# Patient Record
Sex: MALE | Race: WHITE | NOT HISPANIC OR LATINO | Employment: FULL TIME | URBAN - METROPOLITAN AREA
[De-identification: names, ages, dates, MRNs, and addresses within clinical notes are randomized per-mention and may not be internally consistent; named-entity substitution may affect disease eponyms.]

---

## 2023-02-24 LAB
ALANINE AMINOTRANSFERASE (SGPT) (U/L) IN SER/PLAS: 12 U/L (ref 10–52)
ALBUMIN (G/DL) IN SER/PLAS: 4.7 G/DL (ref 3.4–5)
ALKALINE PHOSPHATASE (U/L) IN SER/PLAS: 93 U/L (ref 33–136)
ANION GAP IN SER/PLAS: 11 MMOL/L (ref 10–20)
ASPARTATE AMINOTRANSFERASE (SGOT) (U/L) IN SER/PLAS: 26 U/L (ref 9–39)
BASOPHILS (10*3/UL) IN BLOOD BY AUTOMATED COUNT: 0.08 X10E9/L (ref 0–0.1)
BASOPHILS/100 LEUKOCYTES IN BLOOD BY AUTOMATED COUNT: 1 % (ref 0–2)
BILIRUBIN TOTAL (MG/DL) IN SER/PLAS: 0.5 MG/DL (ref 0–1.2)
CALCIUM (MG/DL) IN SER/PLAS: 9.9 MG/DL (ref 8.6–10.6)
CARBON DIOXIDE, TOTAL (MMOL/L) IN SER/PLAS: 33 MMOL/L (ref 21–32)
CHLORIDE (MMOL/L) IN SER/PLAS: 101 MMOL/L (ref 98–107)
CREATININE (MG/DL) IN SER/PLAS: 0.79 MG/DL (ref 0.5–1.3)
EOSINOPHILS (10*3/UL) IN BLOOD BY AUTOMATED COUNT: 0.26 X10E9/L (ref 0–0.7)
EOSINOPHILS/100 LEUKOCYTES IN BLOOD BY AUTOMATED COUNT: 3.2 % (ref 0–6)
ERYTHROCYTE DISTRIBUTION WIDTH (RATIO) BY AUTOMATED COUNT: 12.3 % (ref 11.5–14.5)
ERYTHROCYTE MEAN CORPUSCULAR HEMOGLOBIN CONCENTRATION (G/DL) BY AUTOMATED: 33 G/DL (ref 32–36)
ERYTHROCYTE MEAN CORPUSCULAR VOLUME (FL) BY AUTOMATED COUNT: 95 FL (ref 80–100)
ERYTHROCYTES (10*6/UL) IN BLOOD BY AUTOMATED COUNT: 4.7 X10E12/L (ref 4.5–5.9)
GFR MALE: >90 ML/MIN/1.73M2
GLUCOSE (MG/DL) IN SER/PLAS: 65 MG/DL (ref 74–99)
HEMATOCRIT (%) IN BLOOD BY AUTOMATED COUNT: 44.6 % (ref 41–52)
HEMOGLOBIN (G/DL) IN BLOOD: 14.7 G/DL (ref 13.5–17.5)
IMMATURE GRANULOCYTES/100 LEUKOCYTES IN BLOOD BY AUTOMATED COUNT: 0.1 % (ref 0–0.9)
LEUKOCYTES (10*3/UL) IN BLOOD BY AUTOMATED COUNT: 8.3 X10E9/L (ref 4.4–11.3)
LYMPHOCYTES (10*3/UL) IN BLOOD BY AUTOMATED COUNT: 2.55 X10E9/L (ref 1.2–4.8)
LYMPHOCYTES/100 LEUKOCYTES IN BLOOD BY AUTOMATED COUNT: 30.9 % (ref 13–44)
MONOCYTES (10*3/UL) IN BLOOD BY AUTOMATED COUNT: 0.81 X10E9/L (ref 0.1–1)
MONOCYTES/100 LEUKOCYTES IN BLOOD BY AUTOMATED COUNT: 9.8 % (ref 2–10)
NEUTROPHILS (10*3/UL) IN BLOOD BY AUTOMATED COUNT: 4.54 X10E9/L (ref 1.2–7.7)
NEUTROPHILS/100 LEUKOCYTES IN BLOOD BY AUTOMATED COUNT: 55 % (ref 40–80)
NRBC (PER 100 WBCS) BY AUTOMATED COUNT: 0 /100 WBC (ref 0–0)
PLATELETS (10*3/UL) IN BLOOD AUTOMATED COUNT: 342 X10E9/L (ref 150–450)
POTASSIUM (MMOL/L) IN SER/PLAS: 4.8 MMOL/L (ref 3.5–5.3)
PROTEIN TOTAL: 6.8 G/DL (ref 6.4–8.2)
SODIUM (MMOL/L) IN SER/PLAS: 140 MMOL/L (ref 136–145)
UREA NITROGEN (MG/DL) IN SER/PLAS: 16 MG/DL (ref 6–23)

## 2023-03-14 DIAGNOSIS — M54.41 CHRONIC LOW BACK PAIN WITH BILATERAL SCIATICA, UNSPECIFIED BACK PAIN LATERALITY: Primary | ICD-10-CM

## 2023-03-14 DIAGNOSIS — G89.29 CHRONIC LOW BACK PAIN WITH BILATERAL SCIATICA, UNSPECIFIED BACK PAIN LATERALITY: Primary | ICD-10-CM

## 2023-03-14 DIAGNOSIS — J43.9 PULMONARY EMPHYSEMA, UNSPECIFIED EMPHYSEMA TYPE (MULTI): ICD-10-CM

## 2023-03-14 DIAGNOSIS — M54.42 CHRONIC LOW BACK PAIN WITH BILATERAL SCIATICA, UNSPECIFIED BACK PAIN LATERALITY: Primary | ICD-10-CM

## 2023-03-17 RX ORDER — GABAPENTIN 300 MG/1
CAPSULE ORAL
Qty: 180 CAPSULE | Refills: 1 | Status: SHIPPED | OUTPATIENT
Start: 2023-03-17 | End: 2023-04-07 | Stop reason: WASHOUT

## 2023-03-17 RX ORDER — IPRATROPIUM BROMIDE 21 UG/1
SPRAY, METERED NASAL
Qty: 30 ML | Refills: 1 | Status: SHIPPED | OUTPATIENT
Start: 2023-03-17 | End: 2023-05-01

## 2023-03-23 DIAGNOSIS — M19.90 ARTHRITIS: Primary | ICD-10-CM

## 2023-03-23 RX ORDER — CETIRIZINE HYDROCHLORIDE 10 MG/1
1 TABLET ORAL DAILY
COMMUNITY
Start: 2020-06-29

## 2023-03-23 RX ORDER — MELOXICAM 7.5 MG/1
TABLET ORAL 2 TIMES DAILY
COMMUNITY
Start: 2022-08-11 | End: 2023-03-23 | Stop reason: SDUPTHER

## 2023-03-23 RX ORDER — ATORVASTATIN CALCIUM 20 MG/1
0.5 TABLET, FILM COATED ORAL NIGHTLY
COMMUNITY
Start: 2018-12-14 | End: 2023-04-07 | Stop reason: SDUPTHER

## 2023-03-23 RX ORDER — PIRFENIDONE 267 MG/1
TABLET, FILM COATED ORAL
COMMUNITY
Start: 2023-02-21 | End: 2023-10-07 | Stop reason: ALTCHOICE

## 2023-03-23 RX ORDER — TIZANIDINE HYDROCHLORIDE 4 MG/1
CAPSULE, GELATIN COATED ORAL
COMMUNITY
Start: 2022-08-11 | End: 2023-10-07 | Stop reason: WASHOUT

## 2023-03-24 LAB
ALANINE AMINOTRANSFERASE (SGPT) (U/L) IN SER/PLAS: 12 U/L (ref 10–52)
ALBUMIN (G/DL) IN SER/PLAS: 4.3 G/DL (ref 3.4–5)
ALKALINE PHOSPHATASE (U/L) IN SER/PLAS: 77 U/L (ref 33–136)
ANION GAP IN SER/PLAS: 10 MMOL/L (ref 10–20)
APPEARANCE, URINE: CLEAR
ASPARTATE AMINOTRANSFERASE (SGOT) (U/L) IN SER/PLAS: 25 U/L (ref 9–39)
BILIRUBIN TOTAL (MG/DL) IN SER/PLAS: 0.6 MG/DL (ref 0–1.2)
BILIRUBIN, URINE: NEGATIVE
BLOOD, URINE: NEGATIVE
CALCIUM (MG/DL) IN SER/PLAS: 10 MG/DL (ref 8.6–10.6)
CARBON DIOXIDE, TOTAL (MMOL/L) IN SER/PLAS: 33 MMOL/L (ref 21–32)
CHLORIDE (MMOL/L) IN SER/PLAS: 100 MMOL/L (ref 98–107)
CHOLESTEROL (MG/DL) IN SER/PLAS: 236 MG/DL (ref 0–199)
CHOLESTEROL IN HDL (MG/DL) IN SER/PLAS: 62.7 MG/DL
CHOLESTEROL/HDL RATIO: 3.8
COLOR, URINE: YELLOW
CREATININE (MG/DL) IN SER/PLAS: 0.78 MG/DL (ref 0.5–1.3)
ERYTHROCYTE DISTRIBUTION WIDTH (RATIO) BY AUTOMATED COUNT: 12.8 % (ref 11.5–14.5)
ERYTHROCYTE MEAN CORPUSCULAR HEMOGLOBIN CONCENTRATION (G/DL) BY AUTOMATED: 32.5 G/DL (ref 32–36)
ERYTHROCYTE MEAN CORPUSCULAR VOLUME (FL) BY AUTOMATED COUNT: 94 FL (ref 80–100)
ERYTHROCYTES (10*6/UL) IN BLOOD BY AUTOMATED COUNT: 4.77 X10E12/L (ref 4.5–5.9)
ESTIMATED AVERAGE GLUCOSE FOR HBA1C: 108 MG/DL
GFR MALE: >90 ML/MIN/1.73M2
GLUCOSE (MG/DL) IN SER/PLAS: 91 MG/DL (ref 74–99)
GLUCOSE, URINE: NEGATIVE MG/DL
HEMATOCRIT (%) IN BLOOD BY AUTOMATED COUNT: 44.6 % (ref 41–52)
HEMOGLOBIN (G/DL) IN BLOOD: 14.5 G/DL (ref 13.5–17.5)
HEMOGLOBIN A1C/HEMOGLOBIN TOTAL IN BLOOD: 5.4 %
KETONES, URINE: NEGATIVE MG/DL
LDL: 144 MG/DL (ref 0–99)
LEUKOCYTE ESTERASE, URINE: NEGATIVE
LEUKOCYTES (10*3/UL) IN BLOOD BY AUTOMATED COUNT: 7.9 X10E9/L (ref 4.4–11.3)
NITRITE, URINE: NEGATIVE
NRBC (PER 100 WBCS) BY AUTOMATED COUNT: 0 /100 WBC (ref 0–0)
PH, URINE: 5 (ref 5–8)
PLATELETS (10*3/UL) IN BLOOD AUTOMATED COUNT: 300 X10E9/L (ref 150–450)
POTASSIUM (MMOL/L) IN SER/PLAS: 4.8 MMOL/L (ref 3.5–5.3)
PROSTATE SPECIFIC ANTIGEN,SCREEN: 1.12 NG/ML (ref 0–4)
PROTEIN TOTAL: 6.7 G/DL (ref 6.4–8.2)
PROTEIN, URINE: NEGATIVE MG/DL
SODIUM (MMOL/L) IN SER/PLAS: 138 MMOL/L (ref 136–145)
SPECIFIC GRAVITY, URINE: 1.01 (ref 1–1.03)
TRIGLYCERIDE (MG/DL) IN SER/PLAS: 146 MG/DL (ref 0–149)
UREA NITROGEN (MG/DL) IN SER/PLAS: 16 MG/DL (ref 6–23)
UROBILINOGEN, URINE: <2 MG/DL (ref 0–1.9)
VLDL: 29 MG/DL (ref 0–40)

## 2023-03-25 RX ORDER — MELOXICAM 7.5 MG/1
7.5 TABLET ORAL 2 TIMES DAILY
Qty: 30 TABLET | Refills: 1 | Status: SHIPPED | OUTPATIENT
Start: 2023-03-25 | End: 2023-04-07 | Stop reason: WASHOUT

## 2023-04-07 ENCOUNTER — OFFICE VISIT (OUTPATIENT)
Dept: PRIMARY CARE | Facility: CLINIC | Age: 63
End: 2023-04-07
Payer: COMMERCIAL

## 2023-04-07 VITALS
HEIGHT: 74 IN | HEART RATE: 59 BPM | WEIGHT: 244 LBS | TEMPERATURE: 98.6 F | SYSTOLIC BLOOD PRESSURE: 130 MMHG | DIASTOLIC BLOOD PRESSURE: 80 MMHG | OXYGEN SATURATION: 96 % | BODY MASS INDEX: 31.32 KG/M2

## 2023-04-07 DIAGNOSIS — Z00.00 ROUTINE GENERAL MEDICAL EXAMINATION AT HEALTH CARE FACILITY: Primary | ICD-10-CM

## 2023-04-07 DIAGNOSIS — M16.10 HIP ARTHRITIS: ICD-10-CM

## 2023-04-07 DIAGNOSIS — Z12.11 SCREENING FOR COLORECTAL CANCER: ICD-10-CM

## 2023-04-07 DIAGNOSIS — Z23 NEED FOR VACCINATION: ICD-10-CM

## 2023-04-07 DIAGNOSIS — Z13.6 SCREENING FOR CARDIOVASCULAR CONDITION: ICD-10-CM

## 2023-04-07 DIAGNOSIS — Z12.12 SCREENING FOR COLORECTAL CANCER: ICD-10-CM

## 2023-04-07 DIAGNOSIS — E78.5 DYSLIPIDEMIA: ICD-10-CM

## 2023-04-07 PROCEDURE — 1036F TOBACCO NON-USER: CPT | Performed by: INTERNAL MEDICINE

## 2023-04-07 PROCEDURE — 93000 ELECTROCARDIOGRAM COMPLETE: CPT | Performed by: INTERNAL MEDICINE

## 2023-04-07 PROCEDURE — 99214 OFFICE O/P EST MOD 30 MIN: CPT | Performed by: INTERNAL MEDICINE

## 2023-04-07 RX ORDER — MELOXICAM 7.5 MG/1
TABLET ORAL 2 TIMES DAILY
COMMUNITY
Start: 2022-08-11 | End: 2023-04-11 | Stop reason: SDUPTHER

## 2023-04-07 RX ORDER — GABAPENTIN 300 MG/1
1 CAPSULE ORAL 2 TIMES DAILY
COMMUNITY
Start: 2023-02-02 | End: 2023-08-23

## 2023-04-07 RX ORDER — ATORVASTATIN CALCIUM 20 MG/1
10 TABLET, FILM COATED ORAL NIGHTLY
Qty: 45 TABLET | Refills: 2 | Status: SHIPPED | OUTPATIENT
Start: 2023-04-07 | End: 2023-04-07

## 2023-04-07 RX ORDER — PNEUMOCOCCAL 20-VALENT CONJUGATE VACCINE 2.2; 2.2; 2.2; 2.2; 2.2; 2.2; 2.2; 2.2; 2.2; 2.2; 2.2; 2.2; 2.2; 2.2; 2.2; 2.2; 4.4; 2.2; 2.2; 2.2 UG/.5ML; UG/.5ML; UG/.5ML; UG/.5ML; UG/.5ML; UG/.5ML; UG/.5ML; UG/.5ML; UG/.5ML; UG/.5ML; UG/.5ML; UG/.5ML; UG/.5ML; UG/.5ML; UG/.5ML; UG/.5ML; UG/.5ML; UG/.5ML; UG/.5ML; UG/.5ML
0.5 INJECTION, SUSPENSION INTRAMUSCULAR ONCE
Qty: 0.5 ML | Refills: 0 | Status: SHIPPED | OUTPATIENT
Start: 2023-04-07 | End: 2023-04-07

## 2023-04-07 ASSESSMENT — ENCOUNTER SYMPTOMS
HEMATOLOGIC/LYMPHATIC NEGATIVE: 1
ACTIVITY CHANGE: 0
CHEST TIGHTNESS: 0
COUGH: 0
APPETITE CHANGE: 0
JOINT SWELLING: 0
FLANK PAIN: 0
APNEA: 0
DIFFICULTY URINATING: 0
HEMATURIA: 0
MYALGIAS: 0
CHILLS: 0
CONSTITUTIONAL NEGATIVE: 1
EYE PAIN: 1
FEVER: 0
FATIGUE: 0

## 2023-04-07 ASSESSMENT — PAIN SCALES - GENERAL: PAINLEVEL: 4

## 2023-04-07 ASSESSMENT — PATIENT HEALTH QUESTIONNAIRE - PHQ9
1. LITTLE INTEREST OR PLEASURE IN DOING THINGS: NOT AT ALL
2. FEELING DOWN, DEPRESSED OR HOPELESS: NOT AT ALL
SUM OF ALL RESPONSES TO PHQ9 QUESTIONS 1 AND 2: 0
SUM OF ALL RESPONSES TO PHQ9 QUESTIONS 1 AND 2: 0
2. FEELING DOWN, DEPRESSED OR HOPELESS: NOT AT ALL
1. LITTLE INTEREST OR PLEASURE IN DOING THINGS: NOT AT ALL

## 2023-04-07 ASSESSMENT — LIFESTYLE VARIABLES
HOW MANY STANDARD DRINKS CONTAINING ALCOHOL DO YOU HAVE ON A TYPICAL DAY: 1 OR 2
AUDIT-C TOTAL SCORE: 3
HOW OFTEN DO YOU HAVE A DRINK CONTAINING ALCOHOL: 2-4 TIMES A MONTH
HOW OFTEN DO YOU HAVE SIX OR MORE DRINKS ON ONE OCCASION: LESS THAN MONTHLY
SKIP TO QUESTIONS 9-10: 0

## 2023-04-07 ASSESSMENT — ACTIVITIES OF DAILY LIVING (ADL)
GROCERY_SHOPPING: INDEPENDENT
DOING_HOUSEWORK: INDEPENDENT
BATHING: INDEPENDENT
TAKING_MEDICATION: INDEPENDENT
MANAGING_FINANCES: INDEPENDENT
DRESSING: INDEPENDENT

## 2023-04-07 ASSESSMENT — COLUMBIA-SUICIDE SEVERITY RATING SCALE - C-SSRS: 1. IN THE PAST MONTH, HAVE YOU WISHED YOU WERE DEAD OR WISHED YOU COULD GO TO SLEEP AND NOT WAKE UP?: NO

## 2023-04-07 ASSESSMENT — VISUAL ACUITY
OS_CC: 20/25
OD_CC: 20/25

## 2023-04-07 NOTE — PROGRESS NOTES
"Subjective   Reason for Visit: Micky Koenig is an 62 y.o. male here for a Medicare Wellness visit.               HPI    Patient Care Team:  Viktor Muñiz MD as PCP - General  Hugo Ayon MD as PCP - Employee ACO PCP     Review of Systems    Objective   Vitals:  /80   Pulse 59   Temp 37 °C (98.6 °F)   Ht 1.88 m (6' 2\")   Wt 111 kg (244 lb)   SpO2 96%   BMI 31.33 kg/m²       Physical Exam    Assessment/Plan   Problem List Items Addressed This Visit    None         "

## 2023-04-07 NOTE — PROGRESS NOTES
"Subjective   Reason for Visit: Micky Koenig is an 62 y.o. male here for a Medicare Wellness visit.          Reviewed all medications by prescribing practitioner or clinical pharmacist (such as prescriptions, OTCs, herbal therapies and supplements) and documented in the medical record.    HPI Patient present to clinic for follow-up visit on history of allergic rhinitis, dyslipidemia, ,IPF,mild obesity and benign colon polyp.  He is doing fairly well and denies any fever, cough, congestion, palpitation and headache.  Laboratory studies done showed hemoglobin A1c of 5.4% serum cholesterol of 236 LDL of 144, hemoglobin of 14.5, bicarb of 33 and other studies are unremarkable.  EKG done shows sinus bradycardia 49 bpm with RSR prime in lead V1, nonspecific T wave inversion in lead III without any definite ischemia.     Patient Care Team:  Viktor Muñiz MD as PCP - General  Hugo Ayon MD as PCP - Employee ACO PCP     Review of Systems   Constitutional: Negative.  Negative for activity change, appetite change, chills, fatigue and fever.   HENT:  Negative for congestion, drooling, ear discharge and ear pain.    Eyes:  Positive for pain (cmment).   Respiratory:  Negative for apnea, cough and chest tightness.    Genitourinary:  Negative for difficulty urinating, enuresis, flank pain and hematuria.   Musculoskeletal:  Negative for joint swelling and myalgias.   Hematological: Negative.        Objective   Vitals:  /80   Pulse 59   Temp 37 °C (98.6 °F)   Ht 1.88 m (6' 2\")   Wt 111 kg (244 lb)   SpO2 96%   BMI 31.33 kg/m²       Physical Exam  Constitutional:       Appearance: Normal appearance. He is obese.   HENT:      Right Ear: Tympanic membrane normal.      Left Ear: Tympanic membrane and ear canal normal.      Nose: Nose normal.   Neck:      Vascular: No carotid bruit.   Cardiovascular:      Rate and Rhythm: Normal rate.   Pulmonary:      Effort: No respiratory distress.      Breath sounds: No stridor. No " wheezing.   Abdominal:      Palpations: Abdomen is soft.      Tenderness: There is no abdominal tenderness. There is no guarding or rebound.   Skin:     Coloration: Skin is not jaundiced.   Neurological:      General: No focal deficit present.      Mental Status: He is alert and oriented to person, place, and time.   Psychiatric:         Mood and Affect: Mood normal.         Assessment/Plan   Problem List Items Addressed This Visit    None  Patient will be scheduled for CT cardiac scoring regarding screening for cardiovascular disease.  He will continue to follow-up with pulmonary clinic regarding history of idiopathic pulmonary fibrosis.  He will also follow-up with orthopedic clinic regarding arthritis of his hips.  He will continue current medications and will return to clinic in 6 months for follow-up visit.

## 2023-04-11 ENCOUNTER — TELEPHONE (OUTPATIENT)
Dept: PRIMARY CARE | Facility: CLINIC | Age: 63
End: 2023-04-11
Payer: COMMERCIAL

## 2023-04-11 DIAGNOSIS — M16.10 HIP ARTHRITIS: Primary | ICD-10-CM

## 2023-04-11 RX ORDER — MELOXICAM 7.5 MG/1
7.5 TABLET ORAL DAILY
Qty: 90 TABLET | Refills: 1 | Status: SHIPPED | OUTPATIENT
Start: 2023-04-11 | End: 2023-04-11

## 2023-04-28 ENCOUNTER — APPOINTMENT (OUTPATIENT)
Dept: LAB | Facility: LAB | Age: 63
End: 2023-04-28
Payer: COMMERCIAL

## 2023-04-28 LAB
ALANINE AMINOTRANSFERASE (SGPT) (U/L) IN SER/PLAS: 10 U/L (ref 10–52)
ALBUMIN (G/DL) IN SER/PLAS: 4.7 G/DL (ref 3.4–5)
ALKALINE PHOSPHATASE (U/L) IN SER/PLAS: 76 U/L (ref 33–136)
ANION GAP IN SER/PLAS: 10 MMOL/L (ref 10–20)
ASPARTATE AMINOTRANSFERASE (SGOT) (U/L) IN SER/PLAS: 21 U/L (ref 9–39)
BASOPHILS (10*3/UL) IN BLOOD BY AUTOMATED COUNT: 0.07 X10E9/L (ref 0–0.1)
BASOPHILS/100 LEUKOCYTES IN BLOOD BY AUTOMATED COUNT: 0.7 % (ref 0–2)
BILIRUBIN DIRECT (MG/DL) IN SER/PLAS: 0.1 MG/DL (ref 0–0.3)
BILIRUBIN TOTAL (MG/DL) IN SER/PLAS: 0.6 MG/DL (ref 0–1.2)
CALCIUM (MG/DL) IN SER/PLAS: 10 MG/DL (ref 8.6–10.3)
CARBON DIOXIDE, TOTAL (MMOL/L) IN SER/PLAS: 33 MMOL/L (ref 21–32)
CHLORIDE (MMOL/L) IN SER/PLAS: 99 MMOL/L (ref 98–107)
CREATININE (MG/DL) IN SER/PLAS: 0.68 MG/DL (ref 0.5–1.3)
EOSINOPHILS (10*3/UL) IN BLOOD BY AUTOMATED COUNT: 0.25 X10E9/L (ref 0–0.7)
EOSINOPHILS/100 LEUKOCYTES IN BLOOD BY AUTOMATED COUNT: 2.7 % (ref 0–6)
ERYTHROCYTE DISTRIBUTION WIDTH (RATIO) BY AUTOMATED COUNT: 13 % (ref 11.5–14.5)
ERYTHROCYTE MEAN CORPUSCULAR HEMOGLOBIN CONCENTRATION (G/DL) BY AUTOMATED: 32.3 G/DL (ref 32–36)
ERYTHROCYTE MEAN CORPUSCULAR VOLUME (FL) BY AUTOMATED COUNT: 96 FL (ref 80–100)
ERYTHROCYTES (10*6/UL) IN BLOOD BY AUTOMATED COUNT: 4.83 X10E12/L (ref 4.5–5.9)
GFR MALE: >90 ML/MIN/1.73M2
GLUCOSE (MG/DL) IN SER/PLAS: 66 MG/DL (ref 74–99)
HEMATOCRIT (%) IN BLOOD BY AUTOMATED COUNT: 46.2 % (ref 41–52)
HEMOGLOBIN (G/DL) IN BLOOD: 14.9 G/DL (ref 13.5–17.5)
IMMATURE GRANULOCYTES/100 LEUKOCYTES IN BLOOD BY AUTOMATED COUNT: 0.2 % (ref 0–0.9)
LEUKOCYTES (10*3/UL) IN BLOOD BY AUTOMATED COUNT: 9.4 X10E9/L (ref 4.4–11.3)
LYMPHOCYTES (10*3/UL) IN BLOOD BY AUTOMATED COUNT: 3.34 X10E9/L (ref 1.2–4.8)
LYMPHOCYTES/100 LEUKOCYTES IN BLOOD BY AUTOMATED COUNT: 35.5 % (ref 13–44)
MONOCYTES (10*3/UL) IN BLOOD BY AUTOMATED COUNT: 1 X10E9/L (ref 0.1–1)
MONOCYTES/100 LEUKOCYTES IN BLOOD BY AUTOMATED COUNT: 10.6 % (ref 2–10)
NEUTROPHILS (10*3/UL) IN BLOOD BY AUTOMATED COUNT: 4.73 X10E9/L (ref 1.2–7.7)
NEUTROPHILS/100 LEUKOCYTES IN BLOOD BY AUTOMATED COUNT: 50.3 % (ref 40–80)
PLATELETS (10*3/UL) IN BLOOD AUTOMATED COUNT: 339 X10E9/L (ref 150–450)
POTASSIUM (MMOL/L) IN SER/PLAS: 4.6 MMOL/L (ref 3.5–5.3)
PROTEIN TOTAL: 7 G/DL (ref 6.4–8.2)
SODIUM (MMOL/L) IN SER/PLAS: 137 MMOL/L (ref 136–145)
UREA NITROGEN (MG/DL) IN SER/PLAS: 19 MG/DL (ref 6–23)

## 2023-04-29 LAB — SARS-COV-2 RESULT: NOT DETECTED

## 2023-05-01 ENCOUNTER — HOSPITAL ENCOUNTER (OUTPATIENT)
Dept: DATA CONVERSION | Facility: HOSPITAL | Age: 63
End: 2023-05-01
Attending: ORTHOPAEDIC SURGERY | Admitting: ORTHOPAEDIC SURGERY
Payer: COMMERCIAL

## 2023-05-01 DIAGNOSIS — E78.5 HYPERLIPIDEMIA, UNSPECIFIED: ICD-10-CM

## 2023-05-01 DIAGNOSIS — M16.12 UNILATERAL PRIMARY OSTEOARTHRITIS, LEFT HIP: ICD-10-CM

## 2023-05-01 DIAGNOSIS — J43.9 PULMONARY EMPHYSEMA, UNSPECIFIED EMPHYSEMA TYPE (MULTI): ICD-10-CM

## 2023-05-01 RX ORDER — IPRATROPIUM BROMIDE 21 UG/1
SPRAY, METERED NASAL
Qty: 30 ML | Refills: 1 | Status: SHIPPED | OUTPATIENT
Start: 2023-05-01 | End: 2023-06-19 | Stop reason: SDUPTHER

## 2023-05-01 RX ORDER — IPRATROPIUM BROMIDE 21 UG/1
SPRAY, METERED NASAL
COMMUNITY
Start: 2021-08-26 | End: 2023-11-27 | Stop reason: SDUPTHER

## 2023-05-24 LAB
COMPLETE PATHOLOGY REPORT: NORMAL
CONVERTED CLINICAL DIAGNOSIS-HISTORY: NORMAL
CONVERTED FINAL DIAGNOSIS: NORMAL
CONVERTED FINAL REPORT PDF LINK TO COPY AND PASTE: NORMAL
CONVERTED GROSS DESCRIPTION: NORMAL

## 2023-05-25 ENCOUNTER — TELEPHONE (OUTPATIENT)
Dept: PRIMARY CARE | Facility: CLINIC | Age: 63
End: 2023-05-25
Payer: COMMERCIAL

## 2023-05-25 DIAGNOSIS — M16.10 HIP ARTHRITIS: Primary | ICD-10-CM

## 2023-05-25 RX ORDER — MELOXICAM 7.5 MG/1
7.5 TABLET ORAL 2 TIMES DAILY
Qty: 60 TABLET | Refills: 2 | Status: SHIPPED | OUTPATIENT
Start: 2023-05-25 | End: 2023-08-15

## 2023-05-25 RX ORDER — OMEPRAZOLE 20 MG/1
20 TABLET, DELAYED RELEASE ORAL DAILY
Qty: 30 TABLET | Refills: 2 | COMMUNITY
Start: 2023-05-25 | End: 2023-10-07 | Stop reason: ALTCHOICE

## 2023-05-25 NOTE — TELEPHONE ENCOUNTER
He is taking meloxicam 7.5 mg and he did not realize that it was switched to 1x a day instead of 2x a day and he is now out of it and the pharmacy said it is too soon to refill and they recommended calling in a new rx for the meloxicam with the directions stating 2x a day instead of 1 so he can get it refilled.

## 2023-06-19 DIAGNOSIS — J43.9 PULMONARY EMPHYSEMA, UNSPECIFIED EMPHYSEMA TYPE (MULTI): ICD-10-CM

## 2023-06-19 RX ORDER — IPRATROPIUM BROMIDE 21 UG/1
2 SPRAY, METERED NASAL 2 TIMES DAILY
Qty: 24 ML | Refills: 0 | Status: SHIPPED | OUTPATIENT
Start: 2023-06-19 | End: 2023-10-07 | Stop reason: ALTCHOICE

## 2023-08-03 LAB
ALANINE AMINOTRANSFERASE (SGPT) (U/L) IN SER/PLAS: 10 U/L (ref 10–52)
ALBUMIN (G/DL) IN SER/PLAS: 4.3 G/DL (ref 3.4–5)
ALKALINE PHOSPHATASE (U/L) IN SER/PLAS: 59 U/L (ref 33–136)
ANION GAP IN SER/PLAS: 11 MMOL/L (ref 10–20)
ASPARTATE AMINOTRANSFERASE (SGOT) (U/L) IN SER/PLAS: 24 U/L (ref 9–39)
BILIRUBIN DIRECT (MG/DL) IN SER/PLAS: 0.1 MG/DL (ref 0–0.3)
BILIRUBIN TOTAL (MG/DL) IN SER/PLAS: 0.4 MG/DL (ref 0–1.2)
CALCIUM (MG/DL) IN SER/PLAS: 9.5 MG/DL (ref 8.6–10.3)
CARBON DIOXIDE, TOTAL (MMOL/L) IN SER/PLAS: 30 MMOL/L (ref 21–32)
CHLORIDE (MMOL/L) IN SER/PLAS: 101 MMOL/L (ref 98–107)
CREATININE (MG/DL) IN SER/PLAS: 0.7 MG/DL (ref 0.5–1.3)
GFR MALE: >90 ML/MIN/1.73M2
GLUCOSE (MG/DL) IN SER/PLAS: 80 MG/DL (ref 74–99)
POTASSIUM (MMOL/L) IN SER/PLAS: 4.2 MMOL/L (ref 3.5–5.3)
PROTEIN TOTAL: 6.6 G/DL (ref 6.4–8.2)
SODIUM (MMOL/L) IN SER/PLAS: 138 MMOL/L (ref 136–145)
UREA NITROGEN (MG/DL) IN SER/PLAS: 21 MG/DL (ref 6–23)

## 2023-08-07 ENCOUNTER — HOSPITAL ENCOUNTER (OUTPATIENT)
Dept: DATA CONVERSION | Facility: HOSPITAL | Age: 63
End: 2023-08-07
Attending: ORTHOPAEDIC SURGERY | Admitting: ORTHOPAEDIC SURGERY
Payer: COMMERCIAL

## 2023-08-07 DIAGNOSIS — M16.11 UNILATERAL PRIMARY OSTEOARTHRITIS, RIGHT HIP: ICD-10-CM

## 2023-08-15 DIAGNOSIS — M16.10 HIP ARTHRITIS: ICD-10-CM

## 2023-08-15 RX ORDER — MELOXICAM 7.5 MG/1
7.5 TABLET ORAL 2 TIMES DAILY
Qty: 60 TABLET | Refills: 1 | Status: SHIPPED | OUTPATIENT
Start: 2023-08-15 | End: 2023-08-15

## 2023-08-23 DIAGNOSIS — G89.29 CHRONIC BILATERAL LOW BACK PAIN WITHOUT SCIATICA: Primary | ICD-10-CM

## 2023-08-23 DIAGNOSIS — M54.50 CHRONIC BILATERAL LOW BACK PAIN WITHOUT SCIATICA: Primary | ICD-10-CM

## 2023-08-23 RX ORDER — GABAPENTIN 300 MG/1
300 CAPSULE ORAL 2 TIMES DAILY
Qty: 180 CAPSULE | Refills: 0 | Status: SHIPPED | OUTPATIENT
Start: 2023-08-23 | End: 2023-08-23

## 2023-09-07 VITALS — BODY MASS INDEX: 31.69 KG/M2 | WEIGHT: 246.91 LBS | HEIGHT: 74 IN

## 2023-09-14 NOTE — H&P
History & Physical Reviewed:   I have reviewed the History and Physical dated:  07-Apr-2023   History and Physical reviewed and relevant findings noted. Patient examined to review pertinent physical  findings.: No significant changes   Home Medications Reviewed: no changes noted   Allergies Reviewed: no changes noted       ERAS (Enhanced Recovery After Surgery):  ·  ERAS Patient: no     Consent:   COVID-19 Consent:  ·  COVID-19 Risk Consent Surgeon has reviewed key risks related to the risk of beck COVID-19 and if they contract COVID-19 what the risks are.     Attestation:   Note Completion:  I am a:  Resident/Fellow   Attending Attestation I saw and evaluated the patient.  I personally obtained the key and critical portions of the history and physical exam or was physically present for key and  critical portions performed by the resident/fellow. I reviewed the resident/fellow?s documentation and discussed the patient with the resident/fellow.  I agree with the resident/fellow?s medical decision making as documented in the note.     I personally evaluated the patient on 01-May-2023         Electronic Signatures:  Shubham Daniel (DO (Resident))  (Signed 01-May-2023 07:09)   Authored: History & Physical Reviewed, ERAS, Consent,  Note Completion  Robert Murdock)  (Signed 01-May-2023 16:26)   Authored: Note Completion   Co-Signer: History & Physical Reviewed, ERAS, Consent, Note Completion      Last Updated: 01-May-2023 16:26 by Robert Murdock)

## 2023-09-29 VITALS — WEIGHT: 238.54 LBS | HEIGHT: 74 IN | BODY MASS INDEX: 30.61 KG/M2

## 2023-09-30 ENCOUNTER — LAB (OUTPATIENT)
Dept: LAB | Facility: LAB | Age: 63
End: 2023-09-30
Payer: COMMERCIAL

## 2023-09-30 DIAGNOSIS — Z00.00 ROUTINE GENERAL MEDICAL EXAMINATION AT HEALTH CARE FACILITY: ICD-10-CM

## 2023-09-30 DIAGNOSIS — M16.10 HIP ARTHRITIS: ICD-10-CM

## 2023-09-30 DIAGNOSIS — E78.5 DYSLIPIDEMIA: ICD-10-CM

## 2023-09-30 LAB
ANION GAP SERPL CALC-SCNC: 13 MMOL/L (ref 10–20)
BASOPHILS # BLD AUTO: 0.06 X10*3/UL (ref 0–0.1)
BASOPHILS NFR BLD AUTO: 0.7 %
BUN SERPL-MCNC: 15 MG/DL (ref 6–23)
CALCIUM SERPL-MCNC: 9.9 MG/DL (ref 8.6–10.6)
CHLORIDE SERPL-SCNC: 103 MMOL/L (ref 98–107)
CHOLEST SERPL-MCNC: 231 MG/DL (ref 0–199)
CHOLESTEROL/HDL RATIO: 3.4
CO2 SERPL-SCNC: 29 MMOL/L (ref 21–32)
CREAT SERPL-MCNC: 0.64 MG/DL (ref 0.5–1.3)
EOSINOPHIL # BLD AUTO: 0.21 X10*3/UL (ref 0–0.7)
EOSINOPHIL NFR BLD AUTO: 2.3 %
ERYTHROCYTE [DISTWIDTH] IN BLOOD BY AUTOMATED COUNT: 13.3 % (ref 11.5–14.5)
GFR SERPL CREATININE-BSD FRML MDRD: >90 ML/MIN/1.73M*2
GLUCOSE SERPL-MCNC: 110 MG/DL (ref 74–99)
HCT VFR BLD AUTO: 44.7 % (ref 41–52)
HDLC SERPL-MCNC: 68.4 MG/DL
HGB BLD-MCNC: 14.9 G/DL (ref 13.5–17.5)
IMM GRANULOCYTES # BLD AUTO: 0.02 X10*3/UL (ref 0–0.7)
IMM GRANULOCYTES NFR BLD AUTO: 0.2 % (ref 0–0.9)
LDLC SERPL CALC-MCNC: 145 MG/DL (ref 140–190)
LYMPHOCYTES # BLD AUTO: 3.3 X10*3/UL (ref 1.2–4.8)
LYMPHOCYTES NFR BLD AUTO: 35.9 %
MCH RBC QN AUTO: 31.6 PG (ref 26–34)
MCHC RBC AUTO-ENTMCNC: 33.3 G/DL (ref 32–36)
MCV RBC AUTO: 95 FL (ref 80–100)
MONOCYTES # BLD AUTO: 0.8 X10*3/UL (ref 0.1–1)
MONOCYTES NFR BLD AUTO: 8.7 %
NEUTROPHILS # BLD AUTO: 4.81 X10*3/UL (ref 1.2–7.7)
NEUTROPHILS NFR BLD AUTO: 52.2 %
NON HDL CHOLESTEROL: 163 MG/DL (ref 0–149)
NRBC BLD-RTO: 0 /100 WBCS (ref 0–0)
PLATELET # BLD AUTO: 332 X10*3/UL (ref 150–450)
PMV BLD AUTO: 9.6 FL (ref 7.5–11.5)
POTASSIUM SERPL-SCNC: 4.5 MMOL/L (ref 3.5–5.3)
RBC # BLD AUTO: 4.72 X10*6/UL (ref 4.5–5.9)
SODIUM SERPL-SCNC: 140 MMOL/L (ref 136–145)
TRIGL SERPL-MCNC: 89 MG/DL (ref 0–149)
VLDL: 18 MG/DL (ref 0–40)
WBC # BLD AUTO: 9.2 X10*3/UL (ref 4.4–11.3)

## 2023-09-30 PROCEDURE — 36415 COLL VENOUS BLD VENIPUNCTURE: CPT

## 2023-09-30 NOTE — H&P
History & Physical Reviewed:   I have reviewed the History and Physical dated:  24-Jul-2023   History and Physical reviewed and relevant findings noted. Patient examined to review pertinent physical  findings.: No significant changes   Home Medications Reviewed: no changes noted   Allergies Reviewed: no changes noted       ERAS (Enhanced Recovery After Surgery):  ·  ERAS Patient: no     Consent:   COVID-19 Consent:  ·  COVID-19 Risk Consent Surgeon has reviewed key risks related to the risk of beck COVID-19 and if they contract COVID-19 what the risks are.       Electronic Signatures:  Robert Murdock)  (Signed 07-Aug-2023 06:59)   Authored: History & Physical Reviewed, ERAS, Consent,  Note Completion      Last Updated: 07-Aug-2023 06:59 by Robert Murdock)

## 2023-10-01 NOTE — OP NOTE
Post Operative Note:     PreOp Diagnosis: Right hip osteoarthritis   Post-Procedure Diagnosis: Same   Procedure: 1. Right direct anterior total hip arthroplasty   Surgeon: Mathieu   Resident/Fellow/Other Assistant: Haase   Anesthesia: Spinal   Estimated Blood Loss (mL): 250cc   Specimen: yes. bone   Findings: severe djd   Implants: elver trident II tritanium 54mm acetabular  shell, 36mm X3 poly liner, accolade II size 9 x 127 femoral stem, biolox delta ceramic 36mm +0mm femoral head   Additional Details: WBAT RLE  ASA 81     Operative Report Dictated:  Dictation: not applicable - note contains Operative  Report    Operative Report:    Statement of medical necessity:    This is a 63-year-old male with severe degenerative disease of the right hip that has failed non operative management. the risks, benefits and alternatives of total hip arthroplasty were discussed with the patient and they signed informed consent.      DESCRIPTION OF PROCEDURE:  The patient was brought to the operative room, and anesthesia was initiated by the anesthesia team. Appropriate preoperative antibiotics were given. The patient was then secured to the [place] table in the standard fashion. The patient was prepped and draped  in the usual sterile fashion, a time out was performed, the patient was identified by name and medical record number and the laterality and site of surgery were confirmed by all present.  Standard direct anterior approach to the hip was made. Hemostasis  was obtained with Bovie electrocautery. Anterior and superior capsulectomy was performed in the usual fashion. Femoral neck osteotomy was performed in accordance with the preoperative plan, and femoral head extracted from the acetabulum without difficulty.  There was noted to be loss of articular cartilage from the femoral head and acetabulum as well as osteophyte formation on both the femoral and acetabular sides.     Periacetabular retractors were placed, with  excellent exposure of the acetabulum being obtained. Remnants of the acetabular labrum were excised. The medial wall of the acetabulum was developed with a reamer 5 mm smaller than the preoperative plan. Reaming  then continued circumferentially. The final reamer was line to line with the the definitive implant size. Good quality bone was noted. After irrigation, the acetabular component was inserted with the appropriate degree of abduction and anteversion based  upon anatomic landmarks. Appropriate positioning was confirmed with an AP image of the pelvis and AP of the operative hip using C-arm. After terminal impaction, the highly cross-linked polyethylene liner was inserted into the acetabular component and  locking mechanism engaged in the usual fashion.    Attention was then directed to the femoral side. The femoral elevating hook was inserted. Further soft tissue release was performed to allow anterior translation of the proximal femur. This included release of the obturator internus tendon. The obturator  externus was preserved. After adequate mobilization of the femur, the medullary canal was developed with a curved curette followed by a reverse cutting rasp. The proximal femur is then prepared using sequentially larger broaches up to the definitive implant  size. The final broach was both rotationally and longitudinally stable. The hip was reduced and flouro was used to assess the implant. There was excellent fit and fill of the implant and the leg lengths looked appropriate comparing the lesser tuberosities  to the ischium on both sides. Stability was then tested; with 60 degrees of external rotation of the hip and full extension the implant was stable.     Trial implant was removed from the femur. The definitive implant was inserted. The trunnion of the femoral component was then cleaned and dried, followed by impaction of the definitive prosthetic femoral head. The hip was reduced with normal findings   again noted.    The wound was irrigated with irrisept solution followed by normal saline. The pericapsular soft tissues were injected with ropivicaine, epinephrine, toradol,  and duramorph. The myofascia was closed using interrupted #1 polysorb, then #2 quill, followed  by skin closure using inverted 2-0 poly, 3-0 v-lock in the subcuticular layer, and perineo dressing.  Mepilex AG silver impregnated dressing was then placed. Patient was awoken from anesthesia by the anesthesia team and brought to the pacu in stable condition    Post operative plan: patient may bear weight as tolerated, anterior hip precautions, antibiotics and dvt prophylaxis per protocol, standard post operative supportive care     Statement of staff presence: I was present during all key and critical portions of the procedure and immediately available during closure.             Attestation:   Note Completion:  I am a: Resident/Fellow   Attending Attestation I was present for key portions of the procedure and the procedure lasted longer than 5 minutes.          Electronic Signatures:  Haase, Lucas (MD (Resident))  (Signed 07-Aug-2023 09:23)   Authored: Post Operative Note, Note Completion  Robert Murdock)  (Signed 07-Aug-2023 12:02)   Authored: Post Operative Note, Note Completion   Co-Signer: Post Operative Note, Note Completion      Last Updated: 07-Aug-2023 12:02 by Robert Murdock)

## 2023-10-02 ENCOUNTER — LAB (OUTPATIENT)
Dept: LAB | Facility: LAB | Age: 63
End: 2023-10-02
Payer: COMMERCIAL

## 2023-10-02 ENCOUNTER — TREATMENT (OUTPATIENT)
Dept: PHYSICAL THERAPY | Facility: CLINIC | Age: 63
End: 2023-10-02
Payer: COMMERCIAL

## 2023-10-02 DIAGNOSIS — Z00.00 ROUTINE GENERAL MEDICAL EXAMINATION AT HEALTH CARE FACILITY: ICD-10-CM

## 2023-10-02 DIAGNOSIS — M25.551 RIGHT HIP PAIN: Primary | ICD-10-CM

## 2023-10-02 LAB
APPEARANCE UR: CLEAR
BILIRUB UR STRIP.AUTO-MCNC: NEGATIVE MG/DL
COLOR UR: YELLOW
GLUCOSE UR STRIP.AUTO-MCNC: NEGATIVE MG/DL
KETONES UR STRIP.AUTO-MCNC: NEGATIVE MG/DL
LEUKOCYTE ESTERASE UR QL STRIP.AUTO: NEGATIVE
NITRITE UR QL STRIP.AUTO: NEGATIVE
PH UR STRIP.AUTO: 5 [PH]
PROT UR STRIP.AUTO-MCNC: NEGATIVE MG/DL
RBC # UR STRIP.AUTO: NEGATIVE /UL
SP GR UR STRIP.AUTO: 1.01
UROBILINOGEN UR STRIP.AUTO-MCNC: <2 MG/DL

## 2023-10-02 PROCEDURE — 97110 THERAPEUTIC EXERCISES: CPT | Mod: GP,CQ

## 2023-10-02 RX ORDER — PIRFENIDONE 801 MG/1
TABLET, FILM COATED ORAL
Qty: 270 TABLET | Refills: 0 | Status: CANCELLED | OUTPATIENT
Start: 2023-10-02 | End: 2024-10-01

## 2023-10-02 ASSESSMENT — PAIN SCALES - GENERAL: PAINLEVEL_OUTOF10: 0 - NO PAIN

## 2023-10-02 ASSESSMENT — PAIN - FUNCTIONAL ASSESSMENT: PAIN_FUNCTIONAL_ASSESSMENT: 0-10

## 2023-10-02 NOTE — Clinical Note
October 2, 2023     Patient: Micky Koenig   YOB: 1960   Date of Visit: 10/2/2023       To Whom It May Concern:    It is my medical opinion that Micky Koenig {Work release (duty restriction):82962}.    If you have any questions or concerns, please don't hesitate to call.         Sincerely,        Michelet Baker, PTA    CC: No Recipients

## 2023-10-02 NOTE — OP NOTE
Post Operative Note:     PreOp Diagnosis: Left hip osteoarthritis   Post-Procedure Diagnosis: same   Procedure: left direct anterior total hip arthroplasty   Surgeon: Dr. Shar Murdock   Resident/Fellow/Other Assistant: Areli Daniel   Anesthesia: spinal   Estimated Blood Loss (mL): 250cc   Specimen: yes. bone   Complications: none   Findings: djd   Patient Returned To/Condition: pacu,stable   Implants: elver  trident II tritanium 54mm acetabular  shell, 36mm X3 poly, accolade II size 9 x 132 femoral stem, biolox delta ceramic 36mm +5mm femoral head     Operative Report Dictated:  Dictation: not applicable - note contains Operative  Report    Operative Report:    Attending addendum: This is an 81-year-old female presented after fall injuring her left hip.  She was unable to ambulate.  This is a new acute potentially limb or life-threatening  injury.  Patient was brought to the emergency room found to have a left femoral neck fracture.  Prior to surgery she ambulated independently without an assistive device and lived independently.  She complains of left hip pain.  On exam she is neurovascular  intact to closed injury.  X-rays of the left hip and pelvis were ordered by me and reviewed independently interpreted by me today, they showed displaced left femoral neck fracture.  I discussed the situation situation with the patient and her family members  in detail including the clinical radiographic findings treatment options risks and benefits.  I would recommend left hip hemiarthroplasty.  After full discussion of the risks benefits and alternatives patient signed informed consent.  She was cleared  by medicine.    I had an extensive discussion with the patient regarding the risks benefits and alternatives of various treatment options. In general nonoperative treatment for this type of fracture would involve extensive bed rest and the associated morbidities. It  would avoid the potential consequences and risks of  surgery. Risks of bed rest for extended period time or include but aren´t limited to pressure ulceration,  urinary and pulmonary infections, deconditioning, and loss of function. The goal and benefit  of surgical intervention will be early mobilization and control of pain. Risks of surgery include but aren´t limited to pain bleeding infection damage to surrounding nerves or blood vessels and other structures blood clots failure of bone to heal and  healing in an inadequate position malunion nonunion potential need for additional surgeries mechanical failure failure of the fixation construct. Patient will require medical clearance  prior to surgery. All questions were answered, no guarnatees were  implied or given. Patient would like to go ahead with surgery she signed informed consent after extensive discussion all risks and benefits.  Statement of medical necessity:    This is a 62-year-old male with severe degenerative disease of the left hip that has failed non operative management. the risks, benefits and alternatives of total hip arthroplasty were discussed with the patient and they signed informed consent.      DESCRIPTION OF PROCEDURE:  The patient was brought to the operative room, and anesthesia was initiated by the anesthesia team. Appropriate preoperative antibiotics were given. The patient was then secured to the [place] table in the standard fashion. The patient was prepped and draped  in the usual sterile fashion, a time out was performed, the patient was identified by name and medical record number and the laterality and site of surgery were confirmed by all present.  Standard direct anterior approach to the hip was made. Hemostasis  was obtained with Bovie electrocautery. Anterior and superior capsulectomy was performed in the usual fashion. Femoral neck osteotomy was performed in accordance with the preoperative plan, and femoral head extracted from the acetabulum without difficulty.  There was noted  to be loss of articular cartilage from the femoral head and acetabulum as well as osteophyte formation on both the femoral and acetabular sides.     Periacetabular retractors were placed, with excellent exposure of the acetabulum being obtained. Remnants of the acetabular labrum were excised. The medial wall of the acetabulum was developed with a reamer 5 mm smaller than the preoperative plan. Reaming  then continued circumferentially. The final reamer was line to line with the the definitive implant size. Good quality bone was noted. After irrigation, the acetabular component was inserted with the appropriate degree of abduction and anteversion based  upon anatomic landmarks. Appropriate positioning was confirmed with an AP image of the pelvis and AP of the operative hip using C-arm. After terminal impaction, the highly cross-linked polyethylene liner was inserted into the acetabular component and  locking mechanism engaged in the usual fashion.    Attention was then directed to the femoral side. The femoral elevating hook was inserted. Further soft tissue release was performed to allow anterior translation of the proximal femur. This included release of the obturator internus tendon. The obturator  externus was preserved. After adequate mobilization of the femur, the medullary canal was developed with a curved curette followed by a reverse cutting rasp. The proximal femur is then prepared using sequentially larger broaches up to the definitive implant  size. The final broach was both rotationally and longitudinally stable. The hip was reduced and flouro was used to assess the implant. There was excellent fit and fill of the implant and the leg lengths looked appropriate comparing the lesser tuberosities  to the ischium on both sides. Stability was then tested; with 60 degrees of external rotation of the hip and full extension the implant was stable.     Trial implant was removed from the femur. The definitive  implant was inserted. The trunnion of the femoral component was then cleaned and dried, followed by impaction of the definitive prosthetic femoral head. The hip was reduced with normal findings  again noted.    The wound was irrigated with irrisept solution followed by normal saline. The pericapsular soft tissues were injected with ropivicaine, epinephrine, toradol,  and duramorph. The myofascia was closed using interrupted #1 polysorb, then #2 quill, followed  by skin closure using inverted 2-0 poly, 3-0 v-lock in the subcuticular layer, and perineo dressing.  Mepilex AG silver impregnated dressing was then placed. Patient was awoken from anesthesia by the anesthesia team and brought to the pacu in stable condition    Post operative plan: patient may bear weight as tolerated, anterior hip precautions, antibiotics and dvt prophylaxis per protocol, standard post operative supportive care     Statement of staff presence: I was present during all key and critical portions of the procedure and immediately available during closure.             Attestation:   Note Completion:  I am a: Resident/Fellow   Attending Attestation I was present for key portions of the procedure and the procedure lasted longer than 5 minutes.          Electronic Signatures:  Shubhma Daniel (DO (Resident))  (Signed 01-May-2023 09:27)   Authored: Post Operative Note, Note Completion  Robert Murdock)  (Signed 01-May-2023 16:32)   Authored: Post Operative Note, Note Completion   Co-Signer: Post Operative Note, Note Completion      Last Updated: 01-May-2023 16:32 by Robert Murdock)

## 2023-10-02 NOTE — Clinical Note
October 2, 2023     Patient: Micky Koenig   YOB: 1960   Date of Visit: 10/2/2023       To Whom it May Concern:    Micky Koenig was seen in my clinic on 10/2/2023. He {Return to school/sport:55234}.    If you have any questions or concerns, please don't hesitate to call.         Sincerely,          Michelet Baker, PTA        CC: No Recipients

## 2023-10-02 NOTE — PROGRESS NOTES
"Physical Therapy    Physical Therapy Treatment    Patient Name: Micky Koenig  MRN: [id number]  Today's Date: 10/2/2023  Time Calculation  Start Time: 0940  Stop Time: 1029  Time Calculation (min): 49 min      Assessment:  PT Assessment  Assessment Comment: PT CONTINUES TO PROGRESS WITH HIS STRENGTH AND BALANCE.  NO C/O PAIN.  PROGESSING TOWARDS GOALS.    Plan:  OP PT Plan  PT Plan: Skilled PT (CONTINUE WITH HIP REPLACEMENT PROTOCOL)  Onset Date: 08/07/23  Certification Period Start Date: 08/22/23  Certification Period End Date: 11/18/23    Current Problem  1. Right hip pain            Subjective   General  General Comment: PT STATES HE DID A LOT OF WALKING OVER THE WEEKEND.  WAS SORE A LITTLE YESTERDAY.  Precautions  Precautions  Precautions Comment: REVIEWED MEDICAL HISTORY  Vital Signs     Pain  Pain Assessment: 0-10  Pain Score: 0 - No pain          Treatments:  Therapeutic Exercise  Therapeutic Exercise Activity 1: SCIFIT HILLS L5 X 6 MIN  Therapeutic Exercise Activity 2: GASTROC STRETCH X 1 MIN  Therapeutic Exercise Activity 3: HEEL RAISES X 1 MIN  Therapeutic Exercise Activity 4: DYNAMICS: TIN SOLDIER, BUTT KICK, MARCH, HIP FLEX, SIDE LUNGE  Therapeutic Exercise Activity 5: SCOOTER 4 X 40'  Therapeutic Exercise Activity 6: FOOTWORM GREEN LOOP 2 X 40', ZIG ZAG - MONSTER F/B X 40' EACH  Therapeutic Exercise Activity 7: TG L7 SQUATS 2 YELLOW BAND X 2 MIN  Therapeutic Exercise Activity 8: BOSU FWD STEP UPS 2 X 1 MIN // BARS  Therapeutic Exercise Activity 9: BOSU LAT STEP OVERS X 2 MIN // BAR  Therapeutic Exercise Activity 10: AIREX BEAM BALANCING WITH PUTTING SWING WITH 2\" PVC X 2 MIN  Therapeutic Exercise Activity 11: REBOUNDER SIDE THROWS 4# R/L X 2 MIN EACH       Goals:     "

## 2023-10-03 DIAGNOSIS — D86.9 SARCOIDOSIS: Primary | ICD-10-CM

## 2023-10-03 RX ORDER — PIRFENIDONE 801 MG/1
TABLET, FILM COATED ORAL
Qty: 270 TABLET | Refills: 0 | Status: SHIPPED | OUTPATIENT
Start: 2023-10-03 | End: 2023-12-11 | Stop reason: SDUPTHER

## 2023-10-04 ENCOUNTER — TELEPHONE (OUTPATIENT)
Dept: PULMONOLOGY | Facility: HOSPITAL | Age: 63
End: 2023-10-04
Payer: COMMERCIAL

## 2023-10-04 NOTE — TELEPHONE ENCOUNTER
[facility]ty Pharmacy verified the prescription was received.  It is currently waiting for prior authorization before dispensing medication.

## 2023-10-06 ENCOUNTER — PHARMACY VISIT (OUTPATIENT)
Dept: PHARMACY | Facility: CLINIC | Age: 63
End: 2023-10-06
Payer: COMMERCIAL

## 2023-10-06 PROCEDURE — RXMED WILLOW AMBULATORY MEDICATION CHARGE

## 2023-10-07 ENCOUNTER — OFFICE VISIT (OUTPATIENT)
Dept: PRIMARY CARE | Facility: CLINIC | Age: 63
End: 2023-10-07
Payer: COMMERCIAL

## 2023-10-07 VITALS
TEMPERATURE: 99.1 F | HEART RATE: 60 BPM | WEIGHT: 235 LBS | DIASTOLIC BLOOD PRESSURE: 79 MMHG | OXYGEN SATURATION: 98 % | HEIGHT: 73 IN | SYSTOLIC BLOOD PRESSURE: 130 MMHG | BODY MASS INDEX: 31.14 KG/M2

## 2023-10-07 DIAGNOSIS — J30.89 NON-SEASONAL ALLERGIC RHINITIS, UNSPECIFIED TRIGGER: ICD-10-CM

## 2023-10-07 DIAGNOSIS — E78.5 DYSLIPIDEMIA: ICD-10-CM

## 2023-10-07 DIAGNOSIS — I25.10 ATHEROSCLEROSIS OF NATIVE CORONARY ARTERY OF NATIVE HEART WITHOUT ANGINA PECTORIS: Primary | ICD-10-CM

## 2023-10-07 DIAGNOSIS — R73.9 HYPERGLYCEMIA: ICD-10-CM

## 2023-10-07 DIAGNOSIS — Z00.00 ROUTINE GENERAL MEDICAL EXAMINATION AT HEALTH CARE FACILITY: ICD-10-CM

## 2023-10-07 DIAGNOSIS — M16.12 OSTEOARTHRITIS OF LEFT HIP, UNSPECIFIED OSTEOARTHRITIS TYPE: ICD-10-CM

## 2023-10-07 DIAGNOSIS — E78.00 PURE HYPERCHOLESTEROLEMIA: ICD-10-CM

## 2023-10-07 DIAGNOSIS — J84.112 IPF (IDIOPATHIC PULMONARY FIBROSIS) (MULTI): ICD-10-CM

## 2023-10-07 PROCEDURE — 1160F RVW MEDS BY RX/DR IN RCRD: CPT | Performed by: INTERNAL MEDICINE

## 2023-10-07 PROCEDURE — 1126F AMNT PAIN NOTED NONE PRSNT: CPT | Performed by: INTERNAL MEDICINE

## 2023-10-07 PROCEDURE — 99213 OFFICE O/P EST LOW 20 MIN: CPT | Performed by: INTERNAL MEDICINE

## 2023-10-07 PROCEDURE — 1159F MED LIST DOCD IN RCRD: CPT | Performed by: INTERNAL MEDICINE

## 2023-10-07 PROCEDURE — 3008F BODY MASS INDEX DOCD: CPT | Performed by: INTERNAL MEDICINE

## 2023-10-07 PROCEDURE — 1036F TOBACCO NON-USER: CPT | Performed by: INTERNAL MEDICINE

## 2023-10-07 RX ORDER — ATORVASTATIN CALCIUM 20 MG/1
20 TABLET, FILM COATED ORAL DAILY
Qty: 90 TABLET | Refills: 1 | Status: SHIPPED | OUTPATIENT
Start: 2023-10-07 | End: 2023-11-27 | Stop reason: SDUPTHER

## 2023-10-07 ASSESSMENT — COLUMBIA-SUICIDE SEVERITY RATING SCALE - C-SSRS: 1. IN THE PAST MONTH, HAVE YOU WISHED YOU WERE DEAD OR WISHED YOU COULD GO TO SLEEP AND NOT WAKE UP?: NO

## 2023-10-07 ASSESSMENT — PATIENT HEALTH QUESTIONNAIRE - PHQ9
2. FEELING DOWN, DEPRESSED OR HOPELESS: NOT AT ALL
1. LITTLE INTEREST OR PLEASURE IN DOING THINGS: NOT AT ALL
SUM OF ALL RESPONSES TO PHQ9 QUESTIONS 1 AND 2: 0

## 2023-10-07 ASSESSMENT — PAIN SCALES - GENERAL: PAINLEVEL: 0-NO PAIN

## 2023-10-07 NOTE — PROGRESS NOTES
"Subjective   Patient ID: Nilton Koenig is a 63 y.o. male who presents for Follow-up.    HPI patient presents to clinic for follow-up visit on history of severe DJD of hips, allergic rhinitis, IPF, mild obesity and dyslipidemia.  He underwent  left direct anterior total hip arthroplasty on 5/1/2023 and right direct anterior total hip arthroplasty on 8/7/2023 secondary to severe degenerative joint disease of hips by Dr. Murdock.  He is doing well and denies any chest pain, shortness of breath, abdominal pain nausea vomiting and palpitation.  He has occasional cough from allergies.  Laboratory studies done shows hemoglobin of 14.9,, serum glucose of 110,, cholesterol of 231 and other studies are unremarkable..  CT cardiac scoring done came back elevated at coronary artery calcium score of 101.19 and borderline ascending aortic aneurysm at 3.9 cm unchanged from previous year.      Review of Systems   Constitutional: Negative.    HENT: Negative.     Eyes: Negative.    Respiratory:  Positive for cough.    Cardiovascular: Negative.    Gastrointestinal: Negative.    Endocrine: Negative.    Genitourinary: Negative.    Musculoskeletal: Negative.    Skin: Negative.    Allergic/Immunologic: Negative.    Neurological: Negative.    Hematological: Negative.    Psychiatric/Behavioral: Negative.         Objective   /79 (BP Location: Left arm, Patient Position: Sitting, BP Cuff Size: Adult)   Pulse 60   Temp 37.3 °C (99.1 °F) (Skin)   Ht 1.854 m (6' 1\")   Wt 107 kg (235 lb)   SpO2 98%   BMI 31.00 kg/m²     Physical Exam  Constitutional:       Appearance: Normal appearance. He is normal weight.   HENT:      Right Ear: Tympanic membrane normal.      Left Ear: Tympanic membrane and ear canal normal.      Nose: Nose normal.   Neck:      Vascular: No carotid bruit.   Cardiovascular:      Rate and Rhythm: Normal rate.   Pulmonary:      Effort: No respiratory distress.      Breath sounds: No stridor. No wheezing.   Abdominal:     "  Palpations: Abdomen is soft.      Tenderness: There is no guarding or rebound.   Skin:     Coloration: Skin is not jaundiced.   Neurological:      General: No focal deficit present.      Mental Status: He is alert and oriented to person, place, and time.   Psychiatric:         Mood and Affect: Mood normal.         Assessment/Plan    patient is advised to increase atorvastatin to 20 mg daily regarding hyperlipidemia.  He will be referred to cardiology clinic regarding coronary atherosclerosis.  He will continue other medications and will return to clinic in 6 months for follow-up visit.

## 2023-10-07 NOTE — PROGRESS NOTES
"Subjective   Patient ID: Nilton Koenig is a 63 y.o. male who presents for Follow-up.    HPI     Review of Systems    Objective   /79 (BP Location: Left arm, Patient Position: Sitting, BP Cuff Size: Adult)   Pulse 60   Temp 37.3 °C (99.1 °F) (Skin)   Ht 1.854 m (6' 1\")   Wt 107 kg (235 lb)   SpO2 98%   BMI 31.00 kg/m²     Physical Exam    Assessment/Plan          "

## 2023-10-08 ASSESSMENT — ENCOUNTER SYMPTOMS
MUSCULOSKELETAL NEGATIVE: 1
NEUROLOGICAL NEGATIVE: 1
EYES NEGATIVE: 1
HEMATOLOGIC/LYMPHATIC NEGATIVE: 1
ALLERGIC/IMMUNOLOGIC NEGATIVE: 1
PSYCHIATRIC NEGATIVE: 1
CARDIOVASCULAR NEGATIVE: 1
ENDOCRINE NEGATIVE: 1
CONSTITUTIONAL NEGATIVE: 1
COUGH: 1
GASTROINTESTINAL NEGATIVE: 1

## 2023-10-09 ENCOUNTER — TREATMENT (OUTPATIENT)
Dept: PHYSICAL THERAPY | Facility: CLINIC | Age: 63
End: 2023-10-09
Payer: COMMERCIAL

## 2023-10-09 DIAGNOSIS — M25.551 RIGHT HIP PAIN: Primary | ICD-10-CM

## 2023-10-09 PROCEDURE — 97110 THERAPEUTIC EXERCISES: CPT | Mod: GP,CQ

## 2023-10-09 ASSESSMENT — PAIN - FUNCTIONAL ASSESSMENT: PAIN_FUNCTIONAL_ASSESSMENT: 0-10

## 2023-10-09 ASSESSMENT — PAIN SCALES - GENERAL: PAINLEVEL_OUTOF10: 0 - NO PAIN

## 2023-10-09 NOTE — PROGRESS NOTES
Physical Therapy Treatment    Patient Name: Micky Koenig  MRN: [id number]  Today's Date: 10/9/2023  Time Calculation  Start Time: 0950  Stop Time: 1036  Time Calculation (min): 46 min  Current Problem  1. Right hip pain            Insurance:     Certification Period Start Date: 08/22/23  Certification Period End Date: 11/18/23    Subjective   General  General Comment: PT STATES HIS HIP CONTINUES TO FEEL BETTER  Precautions     Pain  Pain Assessment: 0-10  Pain Score: 0 - No pain    Objective     General Observation  General Observation: NORMAL GAIT       Treatments:    Therapeutic Exercise  Therapeutic Exercise Activity 1: SCIFIT HILLS L5 X 6 MIN  Therapeutic Exercise Activity 2: GASTROC STRETCH X 1 MIN  Therapeutic Exercise Activity 3: HEEL RAISES X 1 MIN  Therapeutic Exercise Activity 4: DYNAMICS: TIN SOLDIER, BUTT KICK, MARCH, HIP FLEX, SIDE LUNGE  Therapeutic Exercise Activity 5: SCOOTER 4 X 50'  Therapeutic Exercise Activity 6: FOOTWORM GREEN LOOP 2 X 50', ZIG ZAG - MONSTER F/B X 50' EACH  Therapeutic Exercise Activity 7: TG L7 SQUATS 2 YELLOW BAND X 2 MIN  Therapeutic Exercise Activity 8: TILT BOARD BALANCING X 2 MIN // BAR  Therapeutic Exercise Activity 9: BOSU LAT STEP OVERS X 2 MIN // BAR  Therapeutic Exercise Activity 10: MATRIX BACK SWING AND FOLLOW THROUGH 2.5# X 2 MIN EACH  Therapeutic Exercise Activity 11: REBOUNDER SIDE THROWS 6# R/L X 2 MIN EACH                                  Assessment:  PT Assessment  Assessment Comment: PT CONTINUES TO PROGRESS TOWARDS GOALS.  HE WAS A LITTLE FATIGUED AFTER TODAY'S SESSION.    Plan:  OP PT Plan  PT Plan: Skilled PT (CONTINUE WITH HIP REPLACEMENT PROTOCOL. PT TO MAKE 1 MORE APPOINTMENT WITH PRIMARY P.T. FOR RECHECK)  Onset Date: 08/07/23  Certification Period Start Date: 08/22/23  Certification Period End Date: 11/18/23    Goals:       Michelet Baker, PTA

## 2023-10-20 ENCOUNTER — TREATMENT (OUTPATIENT)
Dept: PHYSICAL THERAPY | Facility: CLINIC | Age: 63
End: 2023-10-20
Payer: COMMERCIAL

## 2023-10-20 DIAGNOSIS — M25.551 RIGHT HIP PAIN: Primary | ICD-10-CM

## 2023-10-20 PROCEDURE — 97110 THERAPEUTIC EXERCISES: CPT | Mod: GP | Performed by: PHYSICAL THERAPIST

## 2023-10-20 ASSESSMENT — PAIN - FUNCTIONAL ASSESSMENT: PAIN_FUNCTIONAL_ASSESSMENT: 0-10

## 2023-10-20 ASSESSMENT — PAIN SCALES - GENERAL: PAINLEVEL_OUTOF10: 0 - NO PAIN

## 2023-10-20 NOTE — PROGRESS NOTES
"  Physical Therapy Treatment and Discharge     Patient Name: Micky Koenig  MRN: [id number]  Today's Date: 10/20/2023  Time Calculation  Start Time: 1117  Stop Time: 1200  Time Calculation (min): 43 min  Current Problem  1. Right hip pain            Insurance:   13/13; 30/30 for the year [facility] plan   Certification Period Start Date: 08/22/23  Certification Period End Date: 11/18/23    Subjective   General  Reason for Referral: R TARA  General Comment: Pt reports he's doing well. Able to don/doff shoes and socks without difficulty. Getting more flexible. No trouble with walking and stairs at this point. mild L thigh soreness but feels like mm soreness.      Performing HEP?: Yes    Precautions  Precautions  STEADI Fall Risk Score (The score of 4 or more indicates an increased risk of falling): 0  Precautions Comment: R TARA  Pain  Pain Assessment: 0-10  Pain Score: 0 - No pain    Objective   R SLS 35 sec   L SLS 60 sec    Mild restriction into L ER, R hip mobility WFL     Strength 4+/5 grossly B     Outcome Measures:  Other Measures  Lower Extremity Funtional Score (LEFS): 63/80    Treatments:    Therapeutic Exercise  Therapeutic Exercise Activity 1: SCIFIT HILLS L3 X 5 MIN  Therapeutic Exercise Activity 2: Dynamics: high knees x2, butt kicks, tin soldiers x 2, fwd lunges x 2, x 40'  Therapeutic Exercise Activity 3: R,L hamstring stretch x 1 min each  Therapeutic Exercise Activity 4: R,L fwd 12\" step ups x 1 min each  Therapeutic Exercise Activity 5: R,L lateral 12\" step ups x 1 min each  Therapeutic Exercise Activity 6: R,L march with resistance at feet yellow loop x 1 min each  Therapeutic Exercise Activity 7: *Review banded exercises*  Therapeutic Exercise Activity 8: Squats x 1 min  Therapeutic Exercise Activity 9: R,L SLS ground x 1 min each  Therapeutic Exercise Activity 10: TGL7 B LE squats x 1 min  Therapeutic Exercise Activity 11: TGL7 R>L squats x 1 min, L>R x 1 min  Therapeutic Exercise Activity 12: R,L " piriformis stretch x 30 sec    Manual Therapy  Manual Therapy Performed: Yes  Manual Therapy Activity 1: R/L rectus release    OP EDUCATION:   Access Code: [email or web address]  URL: [email or web address]  Date: 10/20/2023  Prepared by: Gabby Patel    Exercises  - Hip Abduction with Resistance Loop  - 1 x daily - 3 x weekly - 2 sets - 10 reps  - Diagonal Hip Extension with Resistance  - 1 x daily - 3 x weekly - 2 sets - 10 reps  - Hip Extension with Resistance Loop  - 1 x daily - 3 x weekly - 2 sets - 10 reps  - Marching with Resistance  - 1 x daily - 3 x weekly - 2 sets - 10 reps  - Squat  - 1 x daily - 3 x weekly - 2 sets - 10 reps  - Single Leg Stance  - 1 x daily - 3 x weekly - 1 sets - 2-3 reps - 60 sec hold    Assessment:   Pt evaluated and treated x 13 visits to PT s/p R TARA, L TARA earlier in the year. Pt showing excellent progress with ROM, strength and functional mobility. Balance improved as well. Pt appropriate for discharge with HEP at this time.     Plan:  OP PT Plan  PT Plan:  (Discharge Patient)  Onset Date: 08/07/23  Certification Period Start Date: 08/22/23  Certification Period End Date: 11/18/23  *Discharge*     Goals:  Resolved       Right Hip Pain               STG (Met)       Start:  09/25/23    Expected End:  10/06/23    Resolved:  10/20/23    1) Patient will improve LEFS to >46/80 in order to allow for greater completion of functional activities at home and in the community in 10 weeks. MET  2) Patient will be able to complete all normal activities with pain no greater than 0/10 in 6 weeks. PART MET   3) Patient will be able to perform proper sit to stand technique in 6 weeks in order prevent increased pain with ADLs. MET  4) Patient will be independent with HEP in 3 visits to allow for continued improvement in daily tasks at home and in the community. MET         LTG (Met)       Start:  09/25/23    Expected End:  11/03/23    Resolved:  10/20/23    1) Patient will improve LEFS to  >55/80 in order to allow for greater completion of functional activities at home and in the community in 10 weeks. In progress   2) Patient will improve his ER ROM to 50 degrees bilaterally to allow for independent donning and doffing of socks without an assistive device in 6 weeks. IN PROGRESS              Gabby Patel, PT

## 2023-10-30 DIAGNOSIS — Z12.11 SCREEN FOR COLON CANCER: Primary | ICD-10-CM

## 2023-10-30 RX ORDER — SODIUM, POTASSIUM,MAG SULFATES 17.5-3.13G
1 SOLUTION, RECONSTITUTED, ORAL ORAL AS NEEDED
Qty: 354 ML | Refills: 0 | Status: SHIPPED | OUTPATIENT
Start: 2023-10-30 | End: 2024-04-10 | Stop reason: WASHOUT

## 2023-10-31 ENCOUNTER — PHARMACY VISIT (OUTPATIENT)
Dept: PHARMACY | Facility: CLINIC | Age: 63
End: 2023-10-31
Payer: COMMERCIAL

## 2023-11-01 ENCOUNTER — PHARMACY VISIT (OUTPATIENT)
Dept: PHARMACY | Facility: CLINIC | Age: 63
End: 2023-11-01
Payer: COMMERCIAL

## 2023-11-01 DIAGNOSIS — Z96.641 HISTORY OF TOTAL REPLACEMENT OF RIGHT HIP: ICD-10-CM

## 2023-11-01 DIAGNOSIS — Z96.642 HISTORY OF TOTAL LEFT HIP REPLACEMENT: ICD-10-CM

## 2023-11-01 PROCEDURE — RXMED WILLOW AMBULATORY MEDICATION CHARGE

## 2023-11-01 RX ORDER — AMOXICILLIN 500 MG/1
CAPSULE ORAL
Qty: 4 CAPSULE | Refills: 0 | Status: SHIPPED | OUTPATIENT
Start: 2023-11-01 | End: 2023-12-30 | Stop reason: ALTCHOICE

## 2023-11-01 NOTE — TELEPHONE ENCOUNTER
Patient has an upcoming dental cleaning. I have pended amoxicillin to send over to [facility].

## 2023-11-06 ENCOUNTER — PHARMACY VISIT (OUTPATIENT)
Dept: PHARMACY | Facility: CLINIC | Age: 63
End: 2023-11-06
Payer: COMMERCIAL

## 2023-11-06 ENCOUNTER — SPECIALTY PHARMACY (OUTPATIENT)
Dept: PHARMACY | Facility: CLINIC | Age: 63
End: 2023-11-06

## 2023-11-06 PROCEDURE — RXMED WILLOW AMBULATORY MEDICATION CHARGE

## 2023-11-10 ENCOUNTER — SPECIALTY PHARMACY (OUTPATIENT)
Dept: PHARMACY | Facility: CLINIC | Age: 63
End: 2023-11-10

## 2023-11-27 ENCOUNTER — PHARMACY VISIT (OUTPATIENT)
Dept: PHARMACY | Facility: CLINIC | Age: 63
End: 2023-11-27
Payer: COMMERCIAL

## 2023-11-27 DIAGNOSIS — E78.5 DYSLIPIDEMIA: ICD-10-CM

## 2023-11-27 DIAGNOSIS — J30.9 ALLERGIC RHINITIS, UNSPECIFIED SEASONALITY, UNSPECIFIED TRIGGER: Primary | ICD-10-CM

## 2023-11-27 PROCEDURE — RXMED WILLOW AMBULATORY MEDICATION CHARGE

## 2023-11-27 RX ORDER — IPRATROPIUM BROMIDE 21 UG/1
1 SPRAY, METERED NASAL 3 TIMES DAILY
Qty: 30 ML | Refills: 0 | Status: SHIPPED | OUTPATIENT
Start: 2023-11-27 | End: 2024-01-18 | Stop reason: SDUPTHER

## 2023-11-27 RX ORDER — ATORVASTATIN CALCIUM 20 MG/1
20 TABLET, FILM COATED ORAL DAILY
Qty: 90 TABLET | Refills: 1 | Status: SHIPPED | OUTPATIENT
Start: 2023-11-27 | End: 2024-05-13 | Stop reason: SDUPTHER

## 2023-11-28 ENCOUNTER — PHARMACY VISIT (OUTPATIENT)
Dept: PHARMACY | Facility: CLINIC | Age: 63
End: 2023-11-28
Payer: COMMERCIAL

## 2023-11-28 PROCEDURE — RXMED WILLOW AMBULATORY MEDICATION CHARGE

## 2023-12-06 PROCEDURE — RXMED WILLOW AMBULATORY MEDICATION CHARGE

## 2023-12-07 ENCOUNTER — PHARMACY VISIT (OUTPATIENT)
Dept: PHARMACY | Facility: CLINIC | Age: 63
End: 2023-12-07
Payer: COMMERCIAL

## 2023-12-08 DIAGNOSIS — J84.112 IPF (IDIOPATHIC PULMONARY FIBROSIS) (MULTI): ICD-10-CM

## 2023-12-08 DIAGNOSIS — J84.112 IPF (IDIOPATHIC PULMONARY FIBROSIS) (MULTI): Primary | ICD-10-CM

## 2023-12-11 ENCOUNTER — SPECIALTY PHARMACY (OUTPATIENT)
Dept: PHARMACY | Facility: CLINIC | Age: 63
End: 2023-12-11

## 2023-12-11 DIAGNOSIS — D86.9 SARCOIDOSIS: ICD-10-CM

## 2023-12-11 RX ORDER — PIRFENIDONE 801 MG/1
TABLET, FILM COATED ORAL
Qty: 270 TABLET | Refills: 0 | Status: SHIPPED | OUTPATIENT
Start: 2023-12-11 | End: 2024-04-12 | Stop reason: SDUPTHER

## 2023-12-14 ENCOUNTER — APPOINTMENT (OUTPATIENT)
Dept: ORTHOPEDIC SURGERY | Facility: CLINIC | Age: 63
End: 2023-12-14
Payer: COMMERCIAL

## 2023-12-19 ENCOUNTER — ANESTHESIA EVENT (OUTPATIENT)
Dept: ANESTHESIOLOGY | Facility: HOSPITAL | Age: 63
End: 2023-12-19

## 2023-12-19 NOTE — ANESTHESIA PREPROCEDURE EVALUATION
"Patient: Micky Koenig \"Nilton\"    Procedure Information    Date: 12/19/23  Reason: PAT       There were no vitals filed for this visit.    Past Surgical History:   Procedure Laterality Date    OTHER SURGICAL HISTORY  12/14/2018    Hernia repair     Past Medical History:   Diagnosis Date    Body mass index (BMI) 32.0-32.9, adult 08/26/2021    BMI 32.0-32.9,adult       Current Outpatient Medications:     amoxicillin (Amoxil) 500 mg capsule, Take 4 capsules by mouth 1 hour prior to your dental cleaning, Disp: 4 capsule, Rfl: 0    atorvastatin (Lipitor) 20 mg tablet, Take 1 tablet (20 mg) by mouth once daily., Disp: 90 tablet, Rfl: 1    cetirizine (ZyrTEC) 10 mg tablet, Take 1 tablet (10 mg) by mouth once daily., Disp: , Rfl:     ipratropium (Atrovent) 21 mcg (0.03 %) nasal spray, Administer 1 spray into each nostril 3 times a day., Disp: 30 mL, Rfl: 0    pirfenidone (Esbriet) 801 mg tablet tablet, TAKE ONE (1) TABLET BY MOUTH THREE TIMES DAILY., Disp: 270 tablet, Rfl: 0    sodium,potassium,mag sulfates (Suprep) 17.5-3.13-1.6 gram recon soln solution, Mix and drink 1 bottle according to package instructions at 6pm the night before procedure, then mix and drink the second bottle at 4am the day of procedure., Disp: 354 mL, Rfl: 0  Prior to Admission medications    Medication Sig Start Date End Date Taking? Authorizing Provider   amoxicillin (Amoxil) 500 mg capsule Take 4 capsules by mouth 1 hour prior to your dental cleaning 11/1/23   Robert Murdock MD   atorvastatin (Lipitor) 20 mg tablet Take 1 tablet (20 mg) by mouth once daily. 11/27/23 11/26/24  Viktor Muñiz MD   cetirizine (ZyrTEC) 10 mg tablet Take 1 tablet (10 mg) by mouth once daily. 6/29/20   Historical Provider, MD   ipratropium (Atrovent) 21 mcg (0.03 %) nasal spray Administer 1 spray into each nostril 3 times a day. 11/27/23   Viktor Muñiz MD   pirfenidone (Esbriet) 801 mg tablet tablet TAKE ONE (1) TABLET BY MOUTH THREE TIMES DAILY. 12/11/23 12/10/24 " " Hugo Ayon MD   sodium,potassium,mag sulfates (Suprep) 17.5-3.13-1.6 gram recon soln solution Mix and drink 1 bottle according to package instructions at 6pm the night before procedure, then mix and drink the second bottle at 4am the day of procedure. 10/30/23   Edd King MD     Allergies   Allergen Reactions    Pollen Extracts Itching     Social History     Tobacco Use    Smoking status: Never    Smokeless tobacco: Never   Substance Use Topics    Alcohol use: Yes         Chemistry    Lab Results   Component Value Date/Time     09/30/2023 1057    K 4.5 09/30/2023 1057     09/30/2023 1057    CO2 29 09/30/2023 1057    BUN 15 09/30/2023 1057    CREATININE 0.64 09/30/2023 1057    Lab Results   Component Value Date/Time    CALCIUM 9.9 09/30/2023 1057    ALKPHOS 59 08/03/2023 1101    AST 24 08/03/2023 1101    ALT 10 08/03/2023 1101    BILITOT 0.4 08/03/2023 1101          Lab Results   Component Value Date/Time    WBC 9.2 09/30/2023 1057    HGB 14.9 09/30/2023 1057    HCT 44.7 09/30/2023 1057     09/30/2023 1057     No results found for: \"PROTIME\", \"PTT\", \"INR\"  No results found for this or any previous visit (from the past 4464 hour(s)).  No results found for this or any previous visit from the past 1095 days.    Relevant Problems   Cardiovascular   (+) Pure hypercholesterolemia      Pulmonary   (+) IPF (idiopathic pulmonary fibrosis) (CMS/HCC)      Musculoskeletal   (+) Osteoarthritis of left hip       Clinical information reviewed: Pt. History reviewed. No further clearances requested.    Tobacco  Allergies  Meds   Med Hx  Surg Hx   Fam Hx          NPO Detail:  No data recorded     PHYSICAL EXAM: Deferred    Anesthesia Plan    ASA 3     MAC     intravenous induction       "

## 2023-12-20 ENCOUNTER — PRE-ADMISSION TESTING (OUTPATIENT)
Dept: PREADMISSION TESTING | Facility: HOSPITAL | Age: 63
End: 2023-12-20
Payer: COMMERCIAL

## 2023-12-20 RX ORDER — ACETAMINOPHEN 500 MG
500 TABLET ORAL EVERY 6 HOURS PRN
COMMUNITY

## 2023-12-20 ASSESSMENT — DUKE ACTIVITY SCORE INDEX (DASI)
CAN YOU DO LIGHT WORK AROUND THE HOUSE LIKE DUSTING OR WASHING DISHES: YES
CAN YOU TAKE CARE OF YOURSELF (EAT, DRESS, BATHE, OR USE TOILET): YES
CAN YOU PARTICIPATE IN MODERATE RECREATIONAL ACTIVITIES LIKE GOLF, BOWLING, DANCING, DOUBLES TENNIS OR THROWING A BASEBALL OR FOOTBALL: YES
CAN YOU HAVE SEXUAL RELATIONS: YES
CAN YOU RUN A SHORT DISTANCE: YES
DASI METS SCORE: 9
CAN YOU CLIMB A FLIGHT OF STAIRS OR WALK UP A HILL: YES
CAN YOU DO MODERATE WORK AROUND THE HOUSE LIKE VACUUMING, SWEEPING FLOORS OR CARRYING GROCERIES: YES
CAN YOU WALK A BLOCK OR TWO ON LEVEL GROUND: YES
TOTAL_SCORE: 50.7
CAN YOU DO YARD WORK LIKE RAKING LEAVES, WEEDING OR PUSHING A MOWER: YES
CAN YOU DO HEAVY WORK AROUND THE HOUSE LIKE SCRUBBING FLOORS OR LIFTING AND MOVING HEAVY FURNITURE: YES
CAN YOU PARTICIPATE IN STRENOUS SPORTS LIKE SWIMMING, SINGLES TENNIS, FOOTBALL, BASKETBALL, OR SKIING: NO
CAN YOU WALK INDOORS, SUCH AS AROUND YOUR HOUSE: YES

## 2023-12-20 NOTE — PREPROCEDURE INSTRUCTIONS
Call outpatient surgery the day before between 1-3 pm to get your arrival time.   [phone]    No food the day before or day of procedure, only clear liquids. Stay hydrated.     Follow prep instructions.     You can have clear liquids up to 3 hrs prior to your procedure.  NO DAIRY, NO CREAMER, NO NON DAIRY OR ALMOND, OAT, COCONUT ETC.    Please refrain from smoking THC, cigarettes or vaping prior to your procedure at least 24 hrs.     When  you arrive park in the back ER parking lot.  Come through the ER lobby and take the first elevator to second floor.   Check in on the outpatient surgery window.    Leave all valuables at home and remove all piercing's.      Do mouth wash and bath for infection control if applicable.      NO driving for 24 hrs if you have had anesthesia or sedation.   You will also need a  if you are receiving sedation.       Bring glasses so you can read what you are signing.

## 2023-12-21 ENCOUNTER — ANCILLARY PROCEDURE (OUTPATIENT)
Dept: RADIOLOGY | Facility: CLINIC | Age: 63
End: 2023-12-21
Payer: COMMERCIAL

## 2023-12-21 ENCOUNTER — OFFICE VISIT (OUTPATIENT)
Dept: ORTHOPEDIC SURGERY | Facility: CLINIC | Age: 63
End: 2023-12-21
Payer: COMMERCIAL

## 2023-12-21 ENCOUNTER — LAB (OUTPATIENT)
Dept: LAB | Facility: LAB | Age: 63
End: 2023-12-21
Payer: COMMERCIAL

## 2023-12-21 DIAGNOSIS — M25.551 RIGHT HIP PAIN: ICD-10-CM

## 2023-12-21 DIAGNOSIS — J84.112 IPF (IDIOPATHIC PULMONARY FIBROSIS) (MULTI): ICD-10-CM

## 2023-12-21 DIAGNOSIS — M25.552 LEFT HIP PAIN: ICD-10-CM

## 2023-12-21 DIAGNOSIS — M25.552 LEFT HIP PAIN: Primary | ICD-10-CM

## 2023-12-21 LAB
ALBUMIN SERPL BCP-MCNC: 4.4 G/DL (ref 3.4–5)
ALP SERPL-CCNC: 54 U/L (ref 33–136)
ALT SERPL W P-5'-P-CCNC: 9 U/L (ref 10–52)
AST SERPL W P-5'-P-CCNC: 24 U/L (ref 9–39)
BILIRUB DIRECT SERPL-MCNC: 0.1 MG/DL (ref 0–0.3)
BILIRUB SERPL-MCNC: 0.5 MG/DL (ref 0–1.2)
PROT SERPL-MCNC: 6.8 G/DL (ref 6.4–8.2)

## 2023-12-21 PROCEDURE — 80076 HEPATIC FUNCTION PANEL: CPT

## 2023-12-21 PROCEDURE — 73523 X-RAY EXAM HIPS BI 5/> VIEWS: CPT

## 2023-12-21 PROCEDURE — 73522 X-RAY EXAM HIPS BI 3-4 VIEWS: CPT

## 2023-12-21 PROCEDURE — 36415 COLL VENOUS BLD VENIPUNCTURE: CPT

## 2023-12-21 PROCEDURE — 1036F TOBACCO NON-USER: CPT | Performed by: ORTHOPAEDIC SURGERY

## 2023-12-21 PROCEDURE — 1160F RVW MEDS BY RX/DR IN RCRD: CPT | Performed by: ORTHOPAEDIC SURGERY

## 2023-12-21 PROCEDURE — 1159F MED LIST DOCD IN RCRD: CPT | Performed by: ORTHOPAEDIC SURGERY

## 2023-12-21 PROCEDURE — 99213 OFFICE O/P EST LOW 20 MIN: CPT | Performed by: ORTHOPAEDIC SURGERY

## 2023-12-21 PROCEDURE — 3008F BODY MASS INDEX DOCD: CPT | Performed by: ORTHOPAEDIC SURGERY

## 2023-12-21 PROCEDURE — 1126F AMNT PAIN NOTED NONE PRSNT: CPT | Performed by: ORTHOPAEDIC SURGERY

## 2023-12-21 NOTE — PROGRESS NOTES
This is a consultation from Dr. Viktor Muñiz MD for   Chief Complaint   Patient presents with    Right Hip - Follow-up     8/7/23 RT TARA       This is a 63 y.o. male who presents for follow-up for his right total hip, he is about 5 months out.  He is doing great, he has no pain in the hip.  No drainage from his incision no fevers no chills.  He is walking without difficulty he does all his normal activities.  None of the pain he had before surgery.    Physical Exam    There has been no interval change in this patient's past medical, surgical, medications, allergies, family history or social history since the most recent visit to a provider within our department. 14 point review of systems was performed, reviewed, and negative except for pertinent positives documented in the history of present illness.     Constitutional: well developed, well nourished male in no acute distress  Psychiatric: normal mood, appropriate affect  Eyes: sclera anicteric  HENT: normocephalic/atraumatic  CV: regular rate and rhythm   Respiratory: non labored breathing  Integumentary: no rash  Neurological: moves all extremities    Right hip examination: Well-healed surgical incision erythema no drainage no pain with range of motion neurovascular tact distally    Xrays were ordered by me, they were reviewed and independently interpreted by me today, they show stable right total hip arthroplasty no fracture-dislocation or loosening    Procedures      Impression/Plan: This is a 63 y.o. male status post right total hip doing well.  Weightbearing as tolerated activity as tolerated, see him back in 1 year postop with x-rays    BMI Readings from Last 1 Encounters:   10/07/23 31.00 kg/m²      Lab Results   Component Value Date    CREATININE 0.64 09/30/2023     Tobacco Use: Low Risk  (12/21/2023)    Patient History     Smoking Tobacco Use: Never     Smokeless Tobacco Use: Never     Passive Exposure: Not on file      Computed MELD 3.0 unavailable.  Necessary lab results were not found in the last year.  Computed MELD-Na unavailable. Necessary lab results were not found in the last year.       Lab Results   Component Value Date    HGBA1C 5.4 03/24/2023     Lab Results   Component Value Date    STAPHMRSASCR NO Staphylococcus aureus ISOLATED. 07/21/2023

## 2023-12-23 ENCOUNTER — PREP FOR PROCEDURE (OUTPATIENT)
Dept: SURGERY | Facility: HOSPITAL | Age: 63
End: 2023-12-23
Payer: COMMERCIAL

## 2023-12-23 RX ORDER — ONDANSETRON HYDROCHLORIDE 2 MG/ML
4 INJECTION, SOLUTION INTRAVENOUS ONCE AS NEEDED
Status: CANCELLED | OUTPATIENT
Start: 2023-12-23

## 2023-12-23 RX ORDER — SODIUM CHLORIDE, SODIUM LACTATE, POTASSIUM CHLORIDE, CALCIUM CHLORIDE 600; 310; 30; 20 MG/100ML; MG/100ML; MG/100ML; MG/100ML
100 INJECTION, SOLUTION INTRAVENOUS CONTINUOUS
Status: CANCELLED | OUTPATIENT
Start: 2023-12-23

## 2023-12-27 PROCEDURE — RXMED WILLOW AMBULATORY MEDICATION CHARGE

## 2023-12-29 ENCOUNTER — HOSPITAL ENCOUNTER (OUTPATIENT)
Dept: RESPIRATORY THERAPY | Facility: HOSPITAL | Age: 63
Discharge: HOME | End: 2023-12-29
Payer: COMMERCIAL

## 2023-12-29 DIAGNOSIS — J84.112 IPF (IDIOPATHIC PULMONARY FIBROSIS) (MULTI): ICD-10-CM

## 2023-12-29 PROCEDURE — 94726 PLETHYSMOGRAPHY LUNG VOLUMES: CPT

## 2023-12-29 PROCEDURE — 94729 DIFFUSING CAPACITY: CPT

## 2023-12-29 PROCEDURE — 94618 PULMONARY STRESS TESTING: CPT

## 2023-12-29 PROCEDURE — 94618 PULMONARY STRESS TESTING: CPT | Performed by: INTERNAL MEDICINE

## 2023-12-29 PROCEDURE — 94760 N-INVAS EAR/PLS OXIMETRY 1: CPT

## 2023-12-29 PROCEDURE — 94010 BREATHING CAPACITY TEST: CPT | Performed by: INTERNAL MEDICINE

## 2023-12-29 PROCEDURE — 94060 EVALUATION OF WHEEZING: CPT

## 2023-12-29 PROCEDURE — 98960 EDU&TRN PT SELF-MGMT NQHP 1: CPT

## 2023-12-29 PROCEDURE — 94729 DIFFUSING CAPACITY: CPT | Performed by: INTERNAL MEDICINE

## 2023-12-29 PROCEDURE — 94664 DEMO&/EVAL PT USE INHALER: CPT | Mod: 59

## 2023-12-30 ENCOUNTER — ANESTHESIA EVENT (OUTPATIENT)
Dept: GASTROENTEROLOGY | Facility: HOSPITAL | Age: 63
End: 2023-12-30
Payer: COMMERCIAL

## 2023-12-30 ENCOUNTER — ANESTHESIA (OUTPATIENT)
Dept: GASTROENTEROLOGY | Facility: HOSPITAL | Age: 63
End: 2023-12-30
Payer: COMMERCIAL

## 2023-12-30 ENCOUNTER — HOSPITAL ENCOUNTER (OUTPATIENT)
Dept: GASTROENTEROLOGY | Facility: HOSPITAL | Age: 63
Setting detail: OUTPATIENT SURGERY
Discharge: HOME | End: 2023-12-30
Payer: COMMERCIAL

## 2023-12-30 VITALS
HEIGHT: 73 IN | SYSTOLIC BLOOD PRESSURE: 131 MMHG | DIASTOLIC BLOOD PRESSURE: 85 MMHG | RESPIRATION RATE: 17 BRPM | BODY MASS INDEX: 31.26 KG/M2 | OXYGEN SATURATION: 96 % | WEIGHT: 235.89 LBS | TEMPERATURE: 96.8 F | HEART RATE: 57 BPM

## 2023-12-30 DIAGNOSIS — Z12.12 SCREENING FOR COLORECTAL CANCER: ICD-10-CM

## 2023-12-30 DIAGNOSIS — Z12.11 SCREENING FOR COLORECTAL CANCER: ICD-10-CM

## 2023-12-30 PROCEDURE — G0121 COLON CA SCRN NOT HI RSK IND: HCPCS | Performed by: SURGERY

## 2023-12-30 PROCEDURE — 2500000004 HC RX 250 GENERAL PHARMACY W/ HCPCS (ALT 636 FOR OP/ED): Performed by: SURGERY

## 2023-12-30 PROCEDURE — 7100000010 HC PHASE TWO TIME - EACH INCREMENTAL 1 MINUTE

## 2023-12-30 PROCEDURE — 45378 DIAGNOSTIC COLONOSCOPY: CPT | Performed by: SURGERY

## 2023-12-30 PROCEDURE — 2500000005 HC RX 250 GENERAL PHARMACY W/O HCPCS: Performed by: NURSE ANESTHETIST, CERTIFIED REGISTERED

## 2023-12-30 PROCEDURE — 7100000009 HC PHASE TWO TIME - INITIAL BASE CHARGE

## 2023-12-30 PROCEDURE — 3700000002 HC GENERAL ANESTHESIA TIME - EACH INCREMENTAL 1 MINUTE

## 2023-12-30 PROCEDURE — 2500000004 HC RX 250 GENERAL PHARMACY W/ HCPCS (ALT 636 FOR OP/ED): Performed by: NURSE ANESTHETIST, CERTIFIED REGISTERED

## 2023-12-30 PROCEDURE — 3700000001 HC GENERAL ANESTHESIA TIME - INITIAL BASE CHARGE

## 2023-12-30 RX ORDER — LIDOCAINE HYDROCHLORIDE 20 MG/ML
INJECTION, SOLUTION INFILTRATION; PERINEURAL AS NEEDED
Status: DISCONTINUED | OUTPATIENT
Start: 2023-12-30 | End: 2023-12-30

## 2023-12-30 RX ORDER — ONDANSETRON HYDROCHLORIDE 2 MG/ML
4 INJECTION, SOLUTION INTRAVENOUS ONCE AS NEEDED
Status: DISCONTINUED | OUTPATIENT
Start: 2023-12-30 | End: 2023-12-31 | Stop reason: HOSPADM

## 2023-12-30 RX ORDER — PROPOFOL 10 MG/ML
INJECTION, EMULSION INTRAVENOUS AS NEEDED
Status: DISCONTINUED | OUTPATIENT
Start: 2023-12-30 | End: 2023-12-30

## 2023-12-30 RX ORDER — FLUTICASONE PROPIONATE 50 MCG
1 SPRAY, SUSPENSION (ML) NASAL DAILY
COMMUNITY
Start: 2020-06-29 | End: 2023-12-30 | Stop reason: ALTCHOICE

## 2023-12-30 RX ORDER — SODIUM CHLORIDE, SODIUM LACTATE, POTASSIUM CHLORIDE, CALCIUM CHLORIDE 600; 310; 30; 20 MG/100ML; MG/100ML; MG/100ML; MG/100ML
100 INJECTION, SOLUTION INTRAVENOUS CONTINUOUS
Status: DISCONTINUED | OUTPATIENT
Start: 2023-12-30 | End: 2023-12-31 | Stop reason: HOSPADM

## 2023-12-30 RX ADMIN — PROPOFOL 10 MG: 10 INJECTION, EMULSION INTRAVENOUS at 11:50

## 2023-12-30 RX ADMIN — LIDOCAINE HYDROCHLORIDE 100 MG: 20 INJECTION, SOLUTION INFILTRATION; PERINEURAL at 11:45

## 2023-12-30 RX ADMIN — PROPOFOL 10 MG: 10 INJECTION, EMULSION INTRAVENOUS at 11:59

## 2023-12-30 RX ADMIN — PROPOFOL 10 MG: 10 INJECTION, EMULSION INTRAVENOUS at 12:01

## 2023-12-30 RX ADMIN — PROPOFOL 10 MG: 10 INJECTION, EMULSION INTRAVENOUS at 11:48

## 2023-12-30 RX ADMIN — PROPOFOL 10 MG: 10 INJECTION, EMULSION INTRAVENOUS at 12:02

## 2023-12-30 RX ADMIN — PROPOFOL 40 MG: 10 INJECTION, EMULSION INTRAVENOUS at 11:52

## 2023-12-30 RX ADMIN — PROPOFOL 10 MG: 10 INJECTION, EMULSION INTRAVENOUS at 11:51

## 2023-12-30 RX ADMIN — PROPOFOL 10 MG: 10 INJECTION, EMULSION INTRAVENOUS at 11:54

## 2023-12-30 RX ADMIN — PROPOFOL 10 MG: 10 INJECTION, EMULSION INTRAVENOUS at 11:49

## 2023-12-30 RX ADMIN — PROPOFOL 10 MG: 10 INJECTION, EMULSION INTRAVENOUS at 11:55

## 2023-12-30 RX ADMIN — PROPOFOL 10 MG: 10 INJECTION, EMULSION INTRAVENOUS at 12:00

## 2023-12-30 RX ADMIN — SODIUM CHLORIDE, POTASSIUM CHLORIDE, SODIUM LACTATE AND CALCIUM CHLORIDE 100 ML/HR: 600; 310; 30; 20 INJECTION, SOLUTION INTRAVENOUS at 10:54

## 2023-12-30 RX ADMIN — PROPOFOL 20 MG: 10 INJECTION, EMULSION INTRAVENOUS at 11:53

## 2023-12-30 RX ADMIN — PROPOFOL 10 MG: 10 INJECTION, EMULSION INTRAVENOUS at 11:58

## 2023-12-30 RX ADMIN — PROPOFOL 10 MG: 10 INJECTION, EMULSION INTRAVENOUS at 11:57

## 2023-12-30 RX ADMIN — PROPOFOL 10 MG: 10 INJECTION, EMULSION INTRAVENOUS at 11:56

## 2023-12-30 RX ADMIN — PROPOFOL 100 MG: 10 INJECTION, EMULSION INTRAVENOUS at 11:45

## 2023-12-30 SDOH — HEALTH STABILITY: MENTAL HEALTH: CURRENT SMOKER: 0

## 2023-12-30 ASSESSMENT — PAIN - FUNCTIONAL ASSESSMENT
PAIN_FUNCTIONAL_ASSESSMENT: 0-10

## 2023-12-30 ASSESSMENT — COLUMBIA-SUICIDE SEVERITY RATING SCALE - C-SSRS
6. HAVE YOU EVER DONE ANYTHING, STARTED TO DO ANYTHING, OR PREPARED TO DO ANYTHING TO END YOUR LIFE?: NO
2. HAVE YOU ACTUALLY HAD ANY THOUGHTS OF KILLING YOURSELF?: NO
1. IN THE PAST MONTH, HAVE YOU WISHED YOU WERE DEAD OR WISHED YOU COULD GO TO SLEEP AND NOT WAKE UP?: NO

## 2023-12-30 ASSESSMENT — PAIN SCALES - GENERAL
PAINLEVEL_OUTOF10: 0 - NO PAIN

## 2023-12-30 NOTE — DISCHARGE INSTRUCTIONS
Patient Instructions after a Colonoscopy      The anesthetics, sedatives or narcotics which were given to you today will be acting in your body for the next 24 hours, so you might feel a little sleepy or groggy.  This feeling should slowly wear off. Carefully read and follow the instructions.     You received sedation today:  - Do not drive or operate any machinery or power tools of any kind.   - No alcoholic beverages today, not even beer or wine.  - Do not make any important decisions or sign any legal documents.  - No over the counter medications that contain alcohol or that may cause drowsiness.  - Do not make any important decisions or sign any legal documents.    While it is common to experience mild to moderate abdominal distention, gas, or belching after your procedure, if any of these symptoms occur following discharge from the GI Lab or within one week of having your procedure, call the [facility] to be advised whether a visit to your nearest Urgent Care or Emergency Department is indicated.  Take this paper with you if you go.     - If you develop an allergic reaction to the medications that were given during your procedure such as difficulty breathing, rash, hives, severe nausea, vomiting or lightheadedness.  - If you experience chest pain, shortness of breath, severe abdominal pain, fevers and chills.  -If you develop signs and symptoms of bleeding such as blood in your spit, if your stools turn black, tarry, or bloody  - If you have not urinated within 8 hours following your procedure.  - If your IV site becomes painful, red, inflamed, or looks infected.    If you received a biopsy/polypectomy/sphincterotomy the following instructions apply below:    __ Do not use Aspirin containing products, non-steroidal medications or anti-coagulants for one week following your procedure. (Examples of these types of medications are: Advil, Arthrotec, Aleve, Coumadin, Ecotrin, Heparin, Ibuprofen,  Indocin, Motrin, Naprosyn, Nuprin, Plavix, Vioxx, and Voltarin, or their generic forms.  This list is not all-inclusive.  Check with your physician or pharmacist before resuming medications.)   __ Eat a soft diet today.  Avoid foods that are poorly digested for the next 24 hours.  These foods would include: nuts, beans, lettuce, red meats, and fried foods. Start with liquids and advance your diet as tolerated, gradually work up to eating solids.   __ Do not have a Barium Study or Enema for one week.    Your physician recommends the additional following instructions:    -You have a contact number available for emergencies. The signs and symptoms of potential delayed complications were discussed with you. You may return to normal activities tomorrow.  -Resume your previous diet.  -Continue your present medications.   -We are waiting for your pathology results.  -Your physician has recommended a repeat colonoscopy (date to be determined after pending pathology results are reviewed) for surveillance based on pathology results.  -The findings and recommendations have been discussed with you.  -The findings and recommendations were discussed with your family.  - Please see Medication Reconciliation Form for new medication/medications prescribed.       If you experience any problems or have any questions following discharge from the GI Lab, please call:        Nurse Signature                                                                        Date___________________                                                                            Patient/Responsible Party Signature                                        Date___________________

## 2023-12-30 NOTE — ANESTHESIA PREPROCEDURE EVALUATION
"Patient: Micky Koenig \"Nilton\"    Procedure Information       Date/Time: 12/30/23 1130    Scheduled providers: Edd King MD    Procedure: COLONOSCOPY    Location: [facility]            Relevant Problems   Cardiovascular   (+) Pure hypercholesterolemia      Pulmonary   (+) IPF (idiopathic pulmonary fibrosis) (CMS/HCC)      Musculoskeletal   (+) Osteoarthritis of left hip       Clinical information reviewed:   Tobacco  Allergies  Meds   Med Hx  Surg Hx   Fam Hx  Soc Hx        NPO Detail:  NPO/Void Status  Carbonhydrate Drink Given Prior to Surgery? : N  Date of Last Liquid: 12/30/23  Time of Last Liquid: 0730  Date of Last Solid: 12/29/23  Time of Last Solid: 0700  Last Intake Type: Clear fluids  Time of Last Void: 1043         Physical Exam    Airway  Mallampati: II     Cardiovascular - normal exam     Dental - normal exam     Pulmonary - normal exam     Abdominal - normal exam             Anesthesia Plan    ASA 2     MAC     The patient is not a current smoker.  Patient did not smoke on day of procedure.  Education provided regarding risk of obstructive sleep apnea.  intravenous induction   Anesthetic plan and risks discussed with patient.      "

## 2023-12-30 NOTE — ANESTHESIA POSTPROCEDURE EVALUATION
"Patient: Micky Koenig \"Nilton\"    Procedure Summary       Date: 12/30/23 Room / Location: [facility]    Anesthesia Start: 1143 Anesthesia Stop: 1206    Procedure: COLONOSCOPY Diagnosis: Screening for colorectal cancer    Scheduled Providers: Edd King MD Responsible Provider: BRETT Pedersen    Anesthesia Type: MAC ASA Status: 2            Anesthesia Type: MAC    Vitals Value Taken Time   /78 12/30/23 1205   Temp 36 °C (96.8 °F) 12/30/23 1205   Pulse 53 12/30/23 1205   Resp 16 12/30/23 1205   SpO2 97 % 12/30/23 1205       Anesthesia Post Evaluation    Patient location during evaluation: PACU  Patient participation: complete - patient participated  Level of consciousness: awake and alert  Pain management: adequate  Airway patency: patent  Two or more strategies used to mitigate risk of obstructive sleep apnea  Cardiovascular status: acceptable  Respiratory status: acceptable  Hydration status: acceptable  Postoperative Nausea and Vomiting: none        There were no known notable events for this encounter.    "

## 2023-12-30 NOTE — H&P
History Of Present Illness  Nilton Koenig is a 63 y.o. male presenting for a low risk colonoscopy      Past Medical History  Past Medical History:   Diagnosis Date    Body mass index (BMI) 32.0-32.9, adult 08/26/2021    BMI 32.0-32.9,adult       Surgical History  Past Surgical History:   Procedure Laterality Date    JOINT REPLACEMENT Bilateral     total hip    OTHER SURGICAL HISTORY  12/14/2018    Hernia repair        Social History  He reports that he has never smoked. He has never used smokeless tobacco. He reports current alcohol use of about 4.0 standard drinks of alcohol per week. He reports that he does not currently use drugs.    Family History  No family history on file.     Allergies  Pollen extracts    Review of Systems   All other systems reviewed and are negative.       Physical Exam  Vitals reviewed.   Constitutional:       Appearance: Normal appearance.   HENT:      Head: Normocephalic.   Cardiovascular:      Rate and Rhythm: Normal rate and regular rhythm.      Heart sounds: Normal heart sounds.   Pulmonary:      Effort: Pulmonary effort is normal.      Breath sounds: Normal breath sounds.   Abdominal:      General: Abdomen is flat. There is no distension.      Palpations: Abdomen is soft. There is no mass.      Tenderness: There is no abdominal tenderness. There is no guarding.      Hernia: No hernia is present.   Genitourinary:     Testes: Normal.   Musculoskeletal:         General: Normal range of motion.      Cervical back: Normal range of motion.   Skin:     General: Skin is warm.   Neurological:      General: No focal deficit present.   Psychiatric:         Mood and Affect: Mood normal.          Last Recorded Vitals  There were no vitals taken for this visit.    Relevant Results         Assessment/Plan   Active Problems: Low risk colon cancer   There are no active Hospital Problems.    COLONOSCOPY/BIOPSIES.  Risks include, but not limited to pain, infection, bleeding, perforation, missed  lesions, aspiration, risk of cardiac, pulmonary, neurologic, locomotor, anesthetic events, incomplete colonoscopy, and other unforeseen complications including death      Edd King MD

## 2024-01-02 NOTE — ADDENDUM NOTE
Encounter addended by: Yaakov Morocho, RRT on: 1/2/2024 8:20 AM   Actions taken: Charge Capture section accepted

## 2024-01-02 NOTE — ADDENDUM NOTE
Encounter addended by: Francisco Leblanc RN on: 1/2/2024 2:08 PM   Actions taken: Contacts section saved

## 2024-01-07 LAB
MGC ASCENT PFT - FEV1 - PRE: 1.92
MGC ASCENT PFT - FEV1 - PREDICTED: 3.67
MGC ASCENT PFT - FVC - PRE: 2.32
MGC ASCENT PFT - FVC - PREDICTED: 4.87

## 2024-01-07 NOTE — ADDENDUM NOTE
Encounter addended by: Geoffrey Uribe MD on: 1/7/2024 10:43 AM   Actions taken: Charge Capture section accepted

## 2024-01-08 ENCOUNTER — TELEPHONE (OUTPATIENT)
Dept: PULMONOLOGY | Facility: HOSPITAL | Age: 64
End: 2024-01-08

## 2024-01-08 DIAGNOSIS — J84.112 IPF (IDIOPATHIC PULMONARY FIBROSIS) (MULTI): Primary | ICD-10-CM

## 2024-01-09 ENCOUNTER — DOCUMENTATION (OUTPATIENT)
Dept: PULMONOLOGY | Facility: CLINIC | Age: 64
End: 2024-01-09
Payer: COMMERCIAL

## 2024-01-09 NOTE — PROGRESS NOTES
I discussed with the patient the stability of his pulmonary function compared to 09/2023 but the progression compared to May 2023. but the new desaturation on the 6-minute walk test.  On the most recent 6-minute walk test from December the patient walked further than he did in the September.  He did drop to 84% and needed 2 L oxygen to maintain a pulse ox above 90.    We discussed the benefits of oxygen.  Studies suggest that it could increase exercise tolerance.  No study showed that oxygen when prescribed for exertional hypoxemia and ILD improves mortality.   We decided to repeat testing before prescribing oxygen as patient feels no symptoms at this point and he is reluctant to go on it.    I will proceed by getting an HRCT to look for any radiographic progression  We will screen for obstructive sleep apnea by getting a PSG  We will get an echocardiography with bubble to rule out any pulmonary hypertension or PFO  I will refer him to pulmonary rehab  We will continue the pirfenidone for now and repeat testing in 3 to 4 months

## 2024-01-10 ENCOUNTER — PHARMACY VISIT (OUTPATIENT)
Dept: PHARMACY | Facility: CLINIC | Age: 64
End: 2024-01-10
Payer: COMMERCIAL

## 2024-01-17 ENCOUNTER — SPECIALTY PHARMACY (OUTPATIENT)
Dept: PHARMACY | Facility: CLINIC | Age: 64
End: 2024-01-17

## 2024-01-17 NOTE — PROGRESS NOTES
"[facility] Clinical Note    Nilton Koenig is a 63 y.o. male, who is on the specialty pharmacy service for management of: Pulmonary Fibrosis Non-Core with status of: (Enrolled)     Micky was contacted on 1/17/2024.    Refer to the encounter summary report for documentation details about patient counseling and education.      Medication Adherence  The importance of adherence was discussed with the patient and they were advised to take the medication as prescribed by their provider. Micky was encouraged to call his physician's office if they have a question regarding a missed dose.     Conclusion  Rate your quality of life on scale of 1-10: 9  Rate your satisfaction with  Specialty Pharmacy on scale of 1-10: -- (\"Very satisfied\")      Patient advised to contact the pharmacy if there are any changes to his medication list, including prescriptions, OTC medications, herbal products, or supplements. Patient was advised of [facility]’s dispensing process, refill timeline, contact information ([phone]), and patient management follow up. Patient confirmed understanding of education conducted during assessment. All patient questions and concerns were addressed to the best of my ability. Patient was encouraged to contact the specialty pharmacy with any questions or concerns.    Confirmed follow-up outreaches are properly scheduled. Reviewed goals of therapy in the program targets.    Damaris A Weiland, PharmD  "

## 2024-01-18 DIAGNOSIS — J30.9 ALLERGIC RHINITIS, UNSPECIFIED SEASONALITY, UNSPECIFIED TRIGGER: ICD-10-CM

## 2024-01-18 PROCEDURE — RXMED WILLOW AMBULATORY MEDICATION CHARGE

## 2024-01-18 RX ORDER — IPRATROPIUM BROMIDE 21 UG/1
1 SPRAY, METERED NASAL 3 TIMES DAILY
Qty: 30 ML | Refills: 0 | Status: SHIPPED | OUTPATIENT
Start: 2024-01-18 | End: 2024-03-29 | Stop reason: SDUPTHER

## 2024-01-19 ENCOUNTER — PHARMACY VISIT (OUTPATIENT)
Dept: PHARMACY | Facility: CLINIC | Age: 64
End: 2024-01-19
Payer: COMMERCIAL

## 2024-01-22 ENCOUNTER — CLINICAL SUPPORT (OUTPATIENT)
Dept: CARDIAC REHAB | Facility: CLINIC | Age: 64
End: 2024-01-22
Payer: COMMERCIAL

## 2024-01-22 VITALS
HEIGHT: 73 IN | SYSTOLIC BLOOD PRESSURE: 146 MMHG | BODY MASS INDEX: 31.28 KG/M2 | WEIGHT: 236 LBS | DIASTOLIC BLOOD PRESSURE: 82 MMHG | OXYGEN SATURATION: 97 %

## 2024-01-22 DIAGNOSIS — J84.112 IPF (IDIOPATHIC PULMONARY FIBROSIS) (MULTI): ICD-10-CM

## 2024-01-22 ASSESSMENT — PATIENT HEALTH QUESTIONNAIRE - PHQ9
8. MOVING OR SPEAKING SO SLOWLY THAT OTHER PEOPLE COULD HAVE NOTICED. OR THE OPPOSITE, BEING SO FIGETY OR RESTLESS THAT YOU HAVE BEEN MOVING AROUND A LOT MORE THAN USUAL: NOT AT ALL
2. FEELING DOWN, DEPRESSED OR HOPELESS: NOT AT ALL
SUM OF ALL RESPONSES TO PHQ QUESTIONS 1-9: 0
3. TROUBLE FALLING OR STAYING ASLEEP OR SLEEPING TOO MUCH: NOT AT ALL
4. FEELING TIRED OR HAVING LITTLE ENERGY: NOT AT ALL
7. TROUBLE CONCENTRATING ON THINGS, SUCH AS READING THE NEWSPAPER OR WATCHING TELEVISION: NOT AT ALL
SUM OF ALL RESPONSES TO PHQ9 QUESTIONS 1 & 2: 0
5. POOR APPETITE OR OVEREATING: NOT AT ALL
1. LITTLE INTEREST OR PLEASURE IN DOING THINGS: NOT AT ALL
6. FEELING BAD ABOUT YOURSELF - OR THAT YOU ARE A FAILURE OR HAVE LET YOURSELF OR YOUR FAMILY DOWN: NOT AT ALL
SUM OF ALL RESPONSES TO PHQ QUESTIONS 1-9: 0
9. THOUGHTS THAT YOU WOULD BE BETTER OFF DEAD, OR OF HURTING YOURSELF: NOT AT ALL

## 2024-01-22 ASSESSMENT — DUKE ACTIVITY SCORE INDEX (DASI)
CAN YOU DO MODERATE WORK AROUND THE HOUSE LIKE VACUUMING, SWEEPING FLOORS OR CARRYING GROCERIES: YES
CAN YOU WALK INDOORS, SUCH AS AROUND YOUR HOUSE: YES
CAN YOU CLIMB A FLIGHT OF STAIRS OR WALK UP A HILL: YES
CAN YOU HAVE SEXUAL RELATIONS: YES
TOTAL_SCORE: 50.2
CAN YOU PARTICIPATE IN MODERATE RECREATIONAL ACTIVITIES LIKE GOLF, BOWLING, DANCING, DOUBLES TENNIS OR THROWING A BASEBALL OR FOOTBALL: YES
CAN YOU DO LIGHT WORK AROUND THE HOUSE LIKE DUSTING OR WASHING DISHES: YES
DASI METS SCORE: 8.9
CAN YOU TAKE CARE OF YOURSELF (EAT, DRESS, BATHE, OR USE TOILET): YES
CAN YOU DO HEAVY WORK AROUND THE HOUSE LIKE SCRUBBING FLOORS OR LIFTING AND MOVING HEAVY FURNITURE: YES
CAN YOU RUN A SHORT DISTANCE: NO
CAN YOU WALK A BLOCK OR TWO ON LEVEL GROUND: YES
CAN YOU DO YARD WORK LIKE RAKING LEAVES, WEEDING OR PUSHING A MOWER: YES
CAN YOU PARTICIPATE IN STRENOUS SPORTS LIKE SWIMMING, SINGLES TENNIS, FOOTBALL, BASKETBALL, OR SKIING: YES

## 2024-01-22 NOTE — PROGRESS NOTES
Pulmonary Rehabilitation Assessment    Name: Micky Koenig  Medical Record Number: [id number]  YOB: 1960    Today’s Date: 1/22/2024  Primary Care Physician: Viktor Muñiz MD  Referring Physician: Hugo Ayon MD  Program Location: [place]  General  Primary Diagnosis: ILD  Date of Diagnosis: 4/22/22  Session #: Initial eval    Oxygen Assessment  SP02 rest: 97 %  SP02 exertion: 87 %  Oxygen Use  Requires supplemental O2:    Liters at Rest: NA   Liters with Exertion: NA  Using O2 as prescribed:  NA, pt stated that he does not have home oxygen    Oxygen (Supplemental 02 use only)  Demonstrates appropriate knowledge of 02 use? NA  Demonstrates knowledge of 02 safety? NA  Home 02 system: NA  Portable 02: NA  Hypoxemia managed?:  unknown    Home Pulse Oximeter?: Yes    MMRC Survey score:  Intake: 1  Discharge:     Goal Status:  Initial eval  Oxygen Goals: Decrease MMRC score, Learn and use diaphragmatic breathing as needed,  Learn and use pursed lip breathing as needed, Titrate supplemental O2 as needed to keep SpO2 at 88% or greater.  Oxygen Interventions:      Psychosocial Assessment  Pt reported/currently experiencing:  concerned about diagnosis  Currently seeing a mental health provider: No  Social Support:  Yue, spouse  PHQ-9 Survey Score:   Intake: 0  Discharge:     CAT Survey Score:  Intake: 7  Discharge:  Learning assessment/barriers: Work and None  Preferred learning method: Visual   Stages of Change:Preparation    Goal Status: Initial eval  Psychosocial Goals: Decrease CAT score, Decrease PHQ-9 score.  Psychosocial Interventions/Education:     Nutrition Assessment:  Current Dietary Guidelines: no special diet  Barriers to dietary change: No   Diet Habit Survey: Given to patient to return on 1st session   Plate:    Intake:   Discharge:   Diabetes Assessment  Diabetes:No  Weight Management  Height: 73 in  Weight: 236  BMI: 31.1   Goal Status: Initial Eval  Nutrition Goals: Increase PYP to a score  greater than 60  Nutrition Interventions/Education:     Exercise Assessment  Current Home Exercise: Yes   Mode: walking,weather permitting  Days/Week: 2  Min/Day: 30    6-minute Walk Assessment:   Intake: 6-MWT distance (meters): 420     FiO2: 1L N/C     SPO2: 87% placed on 2L N/C  Discharge: 6-MWT distance (meters):      FiO2:     SPO2:    Exercise Prescription:    Based on: {6-min walk test   Frequency:  3 days/week   Mode: Treadmill, NuStep, and Recumbent Cycle   Duration: 30 total aerobic minutes   Intensity: RPE 12-14       Target HR RPE/RPD       Max METS 2.9       SpO2 Range 88 to 100 %       O2 Flow Rate 1 to 2 L/min prn     Modality METS Load Duration  minutes   1 Pre-Exercise   2   2 Treadmill  2.9 2.5 10   3 Nustep 4000  2.9 Load 4  66 vasquez 10   4 Recumbent Cycle  2.9 Load 4  55 rpm 10   5 Airdyne 2.9 .9 10   6 Post-Exercise   2     Resistance Training: No   Home Exercise Prescription given: No   Goal Status: Initial Eval  Exercise Goals: To increase MET level by 5-10% each week, To increase total exercise duration to 30-45 minutes  Exercise Interventions/Education:     Other Core Components/Risk Factor Assessment:    Medication adherence:  Current Medications:    Current Outpatient Medications   Medication Sig Dispense Refill    acetaminophen (Tylenol) 500 mg tablet Take 1 tablet (500 mg) by mouth every 6 hours if needed for mild pain (1 - 3).      atorvastatin (Lipitor) 20 mg tablet Take 1 tablet (20 mg) by mouth once daily. 90 tablet 1    cetirizine (ZyrTEC) 10 mg tablet Take 1 tablet (10 mg) by mouth once daily.      ipratropium (Atrovent) 21 mcg (0.03 %) nasal spray Administer 1 spray into each nostril 3 times a day. 30 mL 0    pirfenidone (Esbriet) 801 mg tablet tablet TAKE ONE (1) TABLET BY MOUTH THREE TIMES DAILY. 270 tablet 0    sodium,potassium,mag sulfates (Suprep) 17.5-3.13-1.6 gram recon soln solution Mix and drink 1 bottle according to package instructions at 6pm the night before procedure,  then mix and drink the second bottle at 4am the day of procedure. 354 mL 0     No current facility-administered medications for this visit.     Allergies:    Allergies   Allergen Reactions    Pollen Extracts Itching       Taking medications as prescribed: Yes      Uses pill box/organizer: No    Carries medication list: No     Blood Pressure Management:  Hx of Hypertension: No   Resting BP: 146/82    Smoking/Tobacco Assessment;    Social History     Tobacco Use   Smoking Status Never   Smokeless Tobacco Never       Goal Status: Initial Eval  Other Core Component Goals: Improve knowledge of lung disease, Manage bronchial hygiene  Other Core Component Interventions/Education:     # of hospital admissions due to lung problems: 0  # of ER visits due to lung problems: 0    Individual Patient Goals:  Walk 60 minutes prior discharge  General Comments:        Rehab Staff Signature: Jennifer Reina RN

## 2024-01-23 ENCOUNTER — OFFICE VISIT (OUTPATIENT)
Dept: CARDIOLOGY | Facility: CLINIC | Age: 64
End: 2024-01-23
Payer: COMMERCIAL

## 2024-01-23 ENCOUNTER — HOSPITAL ENCOUNTER (OUTPATIENT)
Dept: CARDIOLOGY | Facility: CLINIC | Age: 64
Discharge: HOME | End: 2024-01-23
Payer: COMMERCIAL

## 2024-01-23 VITALS
DIASTOLIC BLOOD PRESSURE: 96 MMHG | HEART RATE: 60 BPM | HEIGHT: 74 IN | WEIGHT: 235.1 LBS | SYSTOLIC BLOOD PRESSURE: 151 MMHG | BODY MASS INDEX: 30.17 KG/M2 | OXYGEN SATURATION: 96 %

## 2024-01-23 DIAGNOSIS — Z13.6 ENCOUNTER FOR SCREENING FOR CARDIOVASCULAR DISORDERS: ICD-10-CM

## 2024-01-23 DIAGNOSIS — R93.1 ELEVATED CORONARY ARTERY CALCIUM SCORE: ICD-10-CM

## 2024-01-23 DIAGNOSIS — J84.112 IPF (IDIOPATHIC PULMONARY FIBROSIS) (MULTI): ICD-10-CM

## 2024-01-23 DIAGNOSIS — R03.0 ELEVATED BLOOD PRESSURE READING: Primary | ICD-10-CM

## 2024-01-23 DIAGNOSIS — J84.112 IPF (IDIOPATHIC PULMONARY FIBROSIS) (MULTI): Primary | ICD-10-CM

## 2024-01-23 DIAGNOSIS — R06.09 DOE (DYSPNEA ON EXERTION): ICD-10-CM

## 2024-01-23 PROCEDURE — 93306 TTE W/DOPPLER COMPLETE: CPT | Performed by: INTERNAL MEDICINE

## 2024-01-23 PROCEDURE — 1036F TOBACCO NON-USER: CPT | Performed by: HOSPITALIST

## 2024-01-23 PROCEDURE — 1160F RVW MEDS BY RX/DR IN RCRD: CPT | Performed by: HOSPITALIST

## 2024-01-23 PROCEDURE — 1126F AMNT PAIN NOTED NONE PRSNT: CPT | Performed by: HOSPITALIST

## 2024-01-23 PROCEDURE — 3008F BODY MASS INDEX DOCD: CPT | Performed by: HOSPITALIST

## 2024-01-23 PROCEDURE — 1159F MED LIST DOCD IN RCRD: CPT | Performed by: HOSPITALIST

## 2024-01-23 PROCEDURE — 93306 TTE W/DOPPLER COMPLETE: CPT

## 2024-01-23 PROCEDURE — 99214 OFFICE O/P EST MOD 30 MIN: CPT | Performed by: HOSPITALIST

## 2024-01-23 PROCEDURE — 2500000004 HC RX 250 GENERAL PHARMACY W/ HCPCS (ALT 636 FOR OP/ED): Performed by: INTERNAL MEDICINE

## 2024-01-23 PROCEDURE — 99204 OFFICE O/P NEW MOD 45 MIN: CPT | Performed by: HOSPITALIST

## 2024-01-23 RX ORDER — ASPIRIN 81 MG/1
81 TABLET ORAL DAILY
COMMUNITY

## 2024-01-23 RX ADMIN — PERFLUTREN 3 ML OF DILUTION: 6.52 INJECTION, SUSPENSION INTRAVENOUS at 13:36

## 2024-01-23 ASSESSMENT — COLUMBIA-SUICIDE SEVERITY RATING SCALE - C-SSRS
2. HAVE YOU ACTUALLY HAD ANY THOUGHTS OF KILLING YOURSELF?: NO
1. IN THE PAST MONTH, HAVE YOU WISHED YOU WERE DEAD OR WISHED YOU COULD GO TO SLEEP AND NOT WAKE UP?: NO
6. HAVE YOU EVER DONE ANYTHING, STARTED TO DO ANYTHING, OR PREPARED TO DO ANYTHING TO END YOUR LIFE?: NO

## 2024-01-23 ASSESSMENT — PAIN SCALES - GENERAL: PAINLEVEL: 0-NO PAIN

## 2024-01-23 ASSESSMENT — PATIENT HEALTH QUESTIONNAIRE - PHQ9
1. LITTLE INTEREST OR PLEASURE IN DOING THINGS: NOT AT ALL
SUM OF ALL RESPONSES TO PHQ9 QUESTIONS 1 AND 2: 0
2. FEELING DOWN, DEPRESSED OR HOPELESS: NOT AT ALL

## 2024-01-23 NOTE — PROGRESS NOTES
"Jenn Koenig \"Nilton\" is a 63 y.o. male ith PMH of HLD, idiopathic pulmonary fibrosis, and osteoarthritis, who is here for evaluation of of elevated coronary calcium score.  Patient walks few miles multiple times a week, combination of flat level and up and down hills.  He he reports mild dyspnea exertion when he walks uphill which she attributes to his IPF.  He denies any chest pain, orthopnea, PND, dizziness, or palpitation.  Blood pressure in the office today is elevated at 151/96 mmHg, patient does not check his BP at home.  Patient never smoked.  Patient denies any family history premature CAD or MI. Patient is on aspirin 81 mg once daily and atorvastatin 20 mg once daily.    TTE on 1/23/2024, reviewed by myself [no report is available yet], showed normal LVEF, and borderline LA and RV size.    EKG from 4/8/2023 with sinus bradycardia at 49 bpm, otherwise normal EKG.    Cardiac calcium score from 6/29/2023 was elevated at 101 with 86 in the LAD territory and 14 in the RCA territory    Review of Systems  ROS is negative other than in HPI.      Objective   Physical Exam  General: NAD  HEENT: IEOM, PERRL   Neck: No JVD or carotid bruit  Lungs: CTAB  Heart: RRR, normal S1 and S2, no loud murmurs  Abdomen: Soft, nontender, positive bowel sounds  Extremities: No edema  Neurologic: No FND  Psychiatric: Normal mood and affect    Assessment/Plan   1-elevated coronary calcium score:  -Cardiac calcium score from 6/29/2023 was elevated at 101 with 86 in the LAD territory and 14 in the RCA territory.  -Patient is active without any cardiovascular symptoms other than mild dyspnea on exertion walking uphill, I reached out to his pulmonologist Dr. Ayon; if she thinks his KHALIL is out of proportion to his degree of IPF then we will proceed with cardiac stress testing.  -Continue aspirin 81 mg once daily and atorvastatin.  -Healthy lifestyle modifications.    2-KHALIL:  -As above.    3-elevated blood pressure " readings:  -Patient's blood pressure is elevated today at 151 over 96 mmHg.  No history of hypertension.    -Patient was instructed to check his BP at home daily for 1 week and share results with us or his PCP.      Return to clinic in 1 year.      Britney Sosa MD

## 2024-01-23 NOTE — PATIENT INSTRUCTIONS
Thank you so much for visiting us today.    We will reach out to your pulmonologist Dr. Ayon, if she feels your mild dyspnea on exertion is explained by your lung disease, then there is no need for stress test.    Will continue aspirin 81 mg and atorvastatin.      Will see back in the clinic in 1 year, please feel free to call us at [phone] if you have any questions.

## 2024-01-24 ENCOUNTER — TELEPHONE (OUTPATIENT)
Dept: PULMONOLOGY | Facility: HOSPITAL | Age: 64
End: 2024-01-24
Payer: COMMERCIAL

## 2024-01-24 LAB
AORTIC VALVE PEAK VELOCITY: 1.54 M/S
AV PEAK GRADIENT: 9.5 MMHG
AVA (PEAK VEL): 3.43 CM2
EJECTION FRACTION APICAL 4 CHAMBER: 67.5
EJECTION FRACTION: 69 %
LEFT ATRIUM VOLUME AREA LENGTH INDEX BSA: 32.1 ML/M2
LEFT VENTRICLE INTERNAL DIMENSION DIASTOLE: 4.6 CM (ref 3.5–6)
LEFT VENTRICULAR OUTFLOW TRACT DIAMETER: 2.3 CM
MITRAL VALVE E/A RATIO: 0.69
MITRAL VALVE E/E' RATIO: 7.5
RIGHT VENTRICLE FREE WALL PEAK S': 16.4 CM/S
RIGHT VENTRICLE PEAK SYSTOLIC PRESSURE: 44.3 MMHG
TRICUSPID ANNULAR PLANE SYSTOLIC EXCURSION: 2.8 CM

## 2024-01-24 NOTE — TELEPHONE ENCOUNTER
Spoke with the pt regarding home oxygen therapy. Order for home oxygen concentrator and portable oxygen concentrator sent to [employer] via [facility]. Order confirmation was received. Pt was updated on plan of care and he verbalized understanding.

## 2024-01-25 ENCOUNTER — HOSPITAL ENCOUNTER (OUTPATIENT)
Dept: RADIOLOGY | Facility: HOSPITAL | Age: 64
Discharge: HOME | End: 2024-01-25
Payer: COMMERCIAL

## 2024-01-25 DIAGNOSIS — J84.112 IPF (IDIOPATHIC PULMONARY FIBROSIS) (MULTI): ICD-10-CM

## 2024-01-25 PROCEDURE — 71250 CT THORAX DX C-: CPT

## 2024-02-01 ENCOUNTER — SPECIALTY PHARMACY (OUTPATIENT)
Dept: PHARMACY | Facility: CLINIC | Age: 64
End: 2024-02-01

## 2024-02-02 ENCOUNTER — CLINICAL SUPPORT (OUTPATIENT)
Dept: CARDIAC REHAB | Facility: CLINIC | Age: 64
End: 2024-02-02
Payer: COMMERCIAL

## 2024-02-02 DIAGNOSIS — J84.112 IPF (IDIOPATHIC PULMONARY FIBROSIS) (MULTI): ICD-10-CM

## 2024-02-02 PROCEDURE — 94626 PHY/QHP OP PULM RHB W/MNTR: CPT

## 2024-02-05 ENCOUNTER — CLINICAL SUPPORT (OUTPATIENT)
Dept: CARDIAC REHAB | Facility: CLINIC | Age: 64
End: 2024-02-05
Payer: COMMERCIAL

## 2024-02-05 DIAGNOSIS — J84.112 IPF (IDIOPATHIC PULMONARY FIBROSIS) (MULTI): ICD-10-CM

## 2024-02-05 PROCEDURE — 94626 PHY/QHP OP PULM RHB W/MNTR: CPT | Performed by: INTERNAL MEDICINE

## 2024-02-05 PROCEDURE — RXMED WILLOW AMBULATORY MEDICATION CHARGE

## 2024-02-09 ENCOUNTER — CLINICAL SUPPORT (OUTPATIENT)
Dept: CARDIAC REHAB | Facility: CLINIC | Age: 64
End: 2024-02-09
Payer: COMMERCIAL

## 2024-02-09 DIAGNOSIS — J84.112 IPF (IDIOPATHIC PULMONARY FIBROSIS) (MULTI): ICD-10-CM

## 2024-02-09 PROCEDURE — 94626 PHY/QHP OP PULM RHB W/MNTR: CPT | Performed by: INTERNAL MEDICINE

## 2024-02-12 ENCOUNTER — CLINICAL SUPPORT (OUTPATIENT)
Dept: CARDIAC REHAB | Facility: CLINIC | Age: 64
End: 2024-02-12
Payer: COMMERCIAL

## 2024-02-12 DIAGNOSIS — J84.112 IPF (IDIOPATHIC PULMONARY FIBROSIS) (MULTI): ICD-10-CM

## 2024-02-12 PROCEDURE — 94626 PHY/QHP OP PULM RHB W/MNTR: CPT | Performed by: INTERNAL MEDICINE

## 2024-02-12 NOTE — PROGRESS NOTES
"PCP: Ej Mcnally DO    Referring Provider:     Subjective:   Gil Jiménez is a 65 y.o. male with hx of HTN, HLD, heart failure, GERD, who presents for 6 mo follow up.     3/15/23 - Doing well. Occationally having chest discomfort. Positional and on sleeping on left side.     7/19/21 - Patient states he is having left sided chest pain that comes and goes.  He states it improves with moving around.  Denies any radiation.  Pt reports he can feel his heart beating at night.  States he does drink a lot of coffee.    Lower extremity edema improved with Lasix BID.     Fhx:  Shx: Smoker.   Has discontinued alcohol and drugs for the past 7-8 yrs.     EKG 3/7/22:  Sinus rhythm with short AR           7/19/21:  Sinus bradycardia.  T wave abnormality, consider anterolateral ischemia   ECHO 10/2019:  Normal LV and RV systolic function.  Grade I diastolic dysfunction.                              Modreate mitral regurgitation and moderate tricuspid regurgitation with RVSP of 60 mmHg.   C 10/2019:  Nonobstructive CAD.       Lab Results   Component Value Date     12/08/2021    K 3.9 12/08/2021     (H) 12/08/2021    CO2 33 (H) 12/08/2021    BUN 16 12/08/2021    CREATININE 1.18 12/08/2021    CALCIUM 9.5 12/08/2021    ANIONGAP 4 (L) 12/08/2021    ESTGFRAFRICA 79 12/08/2021       No results found for: CHOL  No results found for: HDL  No results found for: LDLCALC  No results found for: TRIG  No results found for: CHOLHDL    Lab Results   Component Value Date    WBC 3.96 (L) 12/08/2021    HGB 12.2 (L) 12/08/2021    HCT 35.9 (L) 12/08/2021    MCV 80.9 12/08/2021     12/08/2021           Review of Systems   Respiratory:  Positive for shortness of breath. Negative for cough.    Cardiovascular:  Positive for chest pain. Negative for palpitations, orthopnea, claudication, leg swelling and PND.       Objective:   /70   Pulse (!) 58   Resp 18   Ht 5' 8" (1.727 m)   Wt 96.2 kg (212 lb)   BMI 32.23 kg/m² " Please see [facility] for daily session report       Physical Exam  Eyes:      Extraocular Movements: Extraocular movements intact.   Cardiovascular:      Rate and Rhythm: Normal rate and regular rhythm.      Pulses: Normal pulses.      Heart sounds: Normal heart sounds. No murmur heard.  Pulmonary:      Effort: Pulmonary effort is normal.      Breath sounds: Normal breath sounds. No wheezing or rales.   Musculoskeletal:         General: No swelling.   Neurological:      Mental Status: He is alert and oriented to person, place, and time.         Assessment:     1. Essential hypertension        2. Chronic heart failure with preserved ejection fraction        3. Non-cardiac chest pain                Plan:   Essential hypertension  Chronic controlled    Chronic heart failure with preserved ejection fraction  Euvolemic  LVEF normal with G1DD  ON lasix 20mg qd    Non-cardiac chest pain  Chronic chest pain.   [facility] in 2019 with mild non-obs diease  Pain improves with activity - meliton musculoskeletal

## 2024-02-13 ENCOUNTER — SPECIALTY PHARMACY (OUTPATIENT)
Dept: PHARMACY | Facility: CLINIC | Age: 64
End: 2024-02-13

## 2024-02-16 ENCOUNTER — CLINICAL SUPPORT (OUTPATIENT)
Dept: CARDIAC REHAB | Facility: CLINIC | Age: 64
End: 2024-02-16
Payer: COMMERCIAL

## 2024-02-16 DIAGNOSIS — J84.112 IPF (IDIOPATHIC PULMONARY FIBROSIS) (MULTI): ICD-10-CM

## 2024-02-16 PROCEDURE — 94626 PHY/QHP OP PULM RHB W/MNTR: CPT | Performed by: INTERNAL MEDICINE

## 2024-02-19 PROCEDURE — RXMED WILLOW AMBULATORY MEDICATION CHARGE

## 2024-02-20 ENCOUNTER — APPOINTMENT (OUTPATIENT)
Dept: CARDIAC REHAB | Facility: CLINIC | Age: 64
End: 2024-02-20
Payer: COMMERCIAL

## 2024-02-20 ENCOUNTER — PHARMACY VISIT (OUTPATIENT)
Dept: PHARMACY | Facility: CLINIC | Age: 64
End: 2024-02-20
Payer: COMMERCIAL

## 2024-02-21 ENCOUNTER — PHARMACY VISIT (OUTPATIENT)
Dept: PHARMACY | Facility: CLINIC | Age: 64
End: 2024-02-21
Payer: COMMERCIAL

## 2024-02-21 ENCOUNTER — SPECIALTY PHARMACY (OUTPATIENT)
Dept: PHARMACY | Facility: CLINIC | Age: 64
End: 2024-02-21

## 2024-02-23 ENCOUNTER — CLINICAL SUPPORT (OUTPATIENT)
Dept: CARDIAC REHAB | Facility: CLINIC | Age: 64
End: 2024-02-23
Payer: COMMERCIAL

## 2024-02-23 DIAGNOSIS — J84.112 IPF (IDIOPATHIC PULMONARY FIBROSIS) (MULTI): ICD-10-CM

## 2024-02-23 PROCEDURE — 94626 PHY/QHP OP PULM RHB W/MNTR: CPT | Performed by: INTERNAL MEDICINE

## 2024-02-26 ENCOUNTER — DOCUMENTATION (OUTPATIENT)
Dept: CARDIAC REHAB | Facility: CLINIC | Age: 64
End: 2024-02-26

## 2024-02-26 ENCOUNTER — CLINICAL SUPPORT (OUTPATIENT)
Dept: CARDIAC REHAB | Facility: CLINIC | Age: 64
End: 2024-02-26
Payer: COMMERCIAL

## 2024-02-26 DIAGNOSIS — J84.112 IPF (IDIOPATHIC PULMONARY FIBROSIS) (MULTI): ICD-10-CM

## 2024-02-26 PROCEDURE — 94626 PHY/QHP OP PULM RHB W/MNTR: CPT | Performed by: INTERNAL MEDICINE

## 2024-02-26 NOTE — PROGRESS NOTES
Pulmonary Pulmonary Rehabilitation Assessment    Name: Micky Koenig  Medical Record Number: [id number]  YOB: 1960    Today’s Date: 2/26/2024  Primary Care Physician: Viktor Muñiz MD  Referring Physician: Hugo Ayon MD  Program Location: [place]  General  Primary Diagnosis: ILD  Date of Diagnosis: 4/22/22  Session #: 7    Oxygen Assessment  SP02 rest: 97 %  SP02 exertion: 87 %  Oxygen Use  Requires supplemental O2:    Liters at Rest: NA   Liters with Exertion: NA  Using O2 as prescribed:  NA, pt stated that he does not have home oxygen    Oxygen (Supplemental 02 use only)  Demonstrates appropriate knowledge of 02 use? NA  Demonstrates knowledge of 02 safety? NA  Home 02 system: NA  Portable 02: NA  Hypoxemia managed?:  unknown    Home Pulse Oximeter?: Yes    MMRC Survey score:  Intake: 1  Discharge:     Goal Status:  In Progress  Oxygen Goals: Decrease MMRC score, Learn and use diaphragmatic breathing as needed,  Learn and use pursed lip breathing as needed, Titrate supplemental O2 as needed to keep SpO2 at 88% or greater.  Oxygen Interventions:      Psychosocial Assessment  Pt reported/currently experiencing:  concerned about diagnosis  Currently seeing a mental health provider: No  Social Support:  Yue, spouse  PHQ-9 Survey Score:   Intake: 0  Discharge:     CAT Survey Score:  Intake: 7  Discharge:  Learning assessment/barriers: Work and None  Preferred learning method: Visual   Stages of Change:Preparation    Goal Status: In Progress  Psychosocial Goals: Decrease CAT score, Decrease PHQ-9 score.  Psychosocial Interventions/Education:   2/2/24 reviewed initial PHQ-9 score w/ pt/MS    Nutrition Assessment:  Current Dietary Guidelines: no special diet  Barriers to dietary change: No   Diet Habit Survey: Picture Your Plate   Intake: 67  Discharge:   Diabetes Assessment  Diabetes:No  Weight Management  Height: 73 in  Weight: 236  BMI: 31.1   Goal Status: In Progress  Nutrition Goals: Increase PYP  to a score greater than 60  Nutrition Interventions/Education:   2/2/24 Attended label reading lecture, handout given/SAI  2/9/24 Reviewed PYP  w/ pt.  Encouraged pt to read labels for sodium and added sugars/SAI    Exercise Assessment  Current Home Exercise: Yes   Mode: walking,weather permitting  Days/Week: 2  Min/Day: 30    6-minute Walk Assessment:   Intake: 6-MWT distance (meters): 420     FiO2: 1L N/C     SPO2: 87% placed on 2L N/C  Discharge: 6-MWT distance (meters):      FiO2:     SPO2:    Exercise Prescription:    Based on: {6-min walk test   Frequency:  3 days/week   Mode: Treadmill, NuStep, and Recumbent Cycle   Duration: 30 total aerobic minutes   Intensity: RPE 12-14       Target HR RPE/RPD       Max METS 2.9       SpO2 Range 88 to 100 %       O2 Flow Rate 1 to 2 L/min prn     Modality METS Load Duration  minutes   1 Pre-Exercise   2   2 Treadmill  2.9 2.5 11   3 Nustep 4000  2.9 Load 5  110 vasquez 11   4 Recumbent Cycle  2.9 Load 5  60 rpm 11   5 Post-Exercise   5     Resistance Training: No   Home Exercise Prescription given: No   Goal Status: In Progress  Exercise Goals: To increase MET level by 5-10% each week, To increase total exercise duration to 30-45 minutes  Exercise Interventions/Education:     Other Core Components/Risk Factor Assessment:    Medication adherence:  Current Medications:    Current Outpatient Medications   Medication Sig Dispense Refill    acetaminophen (Tylenol) 500 mg tablet Take 1 tablet (500 mg) by mouth every 6 hours if needed for mild pain (1 - 3).      aspirin 81 mg EC tablet Take 1 tablet (81 mg) by mouth once daily.      atorvastatin (Lipitor) 20 mg tablet Take 1 tablet (20 mg) by mouth once daily. 90 tablet 1    cetirizine (ZyrTEC) 10 mg tablet Take 1 tablet (10 mg) by mouth once daily.      ipratropium (Atrovent) 21 mcg (0.03 %) nasal spray Administer 1 spray into each nostril 3 times a day. 30 mL 0    pirfenidone (Esbriet) 801 mg tablet tablet TAKE ONE (1) TABLET BY  MOUTH THREE TIMES DAILY. 270 tablet 0    sodium,potassium,mag sulfates (Suprep) 17.5-3.13-1.6 gram recon soln solution Mix and drink 1 bottle according to package instructions at 6pm the night before procedure, then mix and drink the second bottle at 4am the day of procedure. 354 mL 0     No current facility-administered medications for this visit.     Allergies:    Allergies   Allergen Reactions    Pollen Extracts Itching       Taking medications as prescribed: Yes      Uses pill box/organizer: No    Carries medication list: No     Blood Pressure Management:  Hx of Hypertension: No   Resting BP: 146/82    Smoking/Tobacco Assessment;    Social History     Tobacco Use   Smoking Status Never   Smokeless Tobacco Never       Goal Status: In Progress  Other Core Component Goals: Improve knowledge of lung disease, Manage bronchial hygiene  Other Core Component Interventions/Education:     # of hospital admissions due to lung problems: 0  # of ER visits due to lung problems: 0    Individual Patient Goals:  Walk 60 minutes prior discharge  General Comments:  Mr. Koenig has attended 7 sessions since beginning the program.  He has begun to make progress on his outcomes and goals.  No respiratory issues voiced.      Rehab Staff Signature: Lynda Schulz

## 2024-02-27 ENCOUNTER — PROCEDURE VISIT (OUTPATIENT)
Dept: SLEEP MEDICINE | Facility: CLINIC | Age: 64
End: 2024-02-27
Payer: COMMERCIAL

## 2024-02-27 ENCOUNTER — CLINICAL SUPPORT (OUTPATIENT)
Dept: CARDIAC REHAB | Facility: CLINIC | Age: 64
End: 2024-02-27
Payer: COMMERCIAL

## 2024-02-27 DIAGNOSIS — J84.112 IPF (IDIOPATHIC PULMONARY FIBROSIS) (MULTI): ICD-10-CM

## 2024-02-27 DIAGNOSIS — G47.33 OBSTRUCTIVE SLEEP APNEA (ADULT) (PEDIATRIC): ICD-10-CM

## 2024-02-27 PROCEDURE — 94626 PHY/QHP OP PULM RHB W/MNTR: CPT | Performed by: INTERNAL MEDICINE

## 2024-02-27 PROCEDURE — 95810 POLYSOM 6/> YRS 4/> PARAM: CPT | Performed by: INTERNAL MEDICINE

## 2024-02-28 VITALS
OXYGEN SATURATION: 98 % | HEIGHT: 73 IN | SYSTOLIC BLOOD PRESSURE: 146 MMHG | BODY MASS INDEX: 31.26 KG/M2 | WEIGHT: 235.89 LBS | DIASTOLIC BLOOD PRESSURE: 82 MMHG | RESPIRATION RATE: 17 BRPM

## 2024-02-28 ASSESSMENT — SLEEP AND FATIGUE QUESTIONNAIRES
HOW LIKELY ARE YOU TO NOD OFF OR FALL ASLEEP IN A CAR, WHILE STOPPED FOR A FEW MINUTES IN TRAFFIC: WOULD NEVER DOZE
SITING INACTIVE IN A PUBLIC PLACE LIKE A CLASS ROOM OR A MOVIE THEATER: WOULD NEVER DOZE
HOW LIKELY ARE YOU TO NOD OFF OR FALL ASLEEP WHILE LYING DOWN TO REST IN THE AFTERNOON WHEN CIRCUMSTANCES PERMIT: MODERATE CHANCE OF DOZING
HOW LIKELY ARE YOU TO NOD OFF OR FALL ASLEEP WHILE SITTING QUIETLY AFTER LUNCH WITHOUT ALCOHOL: SLIGHT CHANCE OF DOZING
HOW LIKELY ARE YOU TO NOD OFF OR FALL ASLEEP WHILE SITTING AND READING: WOULD NEVER DOZE
HOW LIKELY ARE YOU TO NOD OFF OR FALL ASLEEP WHILE WATCHING TV: MODERATE CHANCE OF DOZING
HOW LIKELY ARE YOU TO NOD OFF OR FALL ASLEEP WHILE SITTING AND TALKING TO SOMEONE: WOULD NEVER DOZE
ESS-CHAD TOTAL SCORE: 6
HOW LIKELY ARE YOU TO NOD OFF OR FALL ASLEEP WHEN YOU ARE A PASSENGER IN A CAR FOR AN HOUR WITHOUT A BREAK: SLIGHT CHANCE OF DOZING

## 2024-02-28 NOTE — PROGRESS NOTES
[facility] TECH NOTE:     Patient: Micky Koenig   MRN//AGE: [id number]  1960  63 y.o.   Technologist: Marcelino Puga   Room: 4   Service Date: 2024        Sleep Testing Location: [facility]  Neck Cir 39cm  [place]: 6    TECHNOLOGIST SLEEP STUDY PROCEDURE NOTE:   This sleep study is being conducted according to the policies and procedures outlined by the AAS accreditation standards.  The sleep study procedure and processes involved during this appointment was explained to the patient/patient’s family, questions were answered. The patient/family verbalized understanding.      The patient is a 63 y.o. year old male scheduled for a Diagnostic PSG Split night. he arrived for his appointment.      The study that was ultimately completed was a Diagnostic PSG Split night   The full study Was completed.  Patient questionnaires completed?: yes     Consents signed? yes    Initial Fall Risk Screening:     Micky has not fallen in the last 6 months. Micky does not have a fear of falling. He does not need assistance with sitting, standing, or walking. he does not need assistance walking in his home. he does not need assistance in an unfamiliar setting. The patient is notusing an assistive device.     Brief Study observations: This is a 63 year-old male here for a PSG-Split Night Study. CPAP was not initiate. The patient refused to sign the CPAP consent    Deviation to order/protocol and reason: Patient refused CPAP      If PAP, which was preferred mask/pressure/mode: None      Other:None    After the procedure, the patient/family was informed to ensure followup with ordering clinician for testing results.      Technologist: Marcelino Puga

## 2024-03-01 ENCOUNTER — CLINICAL SUPPORT (OUTPATIENT)
Dept: CARDIAC REHAB | Facility: CLINIC | Age: 64
End: 2024-03-01
Payer: COMMERCIAL

## 2024-03-01 DIAGNOSIS — J84.112 IPF (IDIOPATHIC PULMONARY FIBROSIS) (MULTI): ICD-10-CM

## 2024-03-01 PROCEDURE — 94626 PHY/QHP OP PULM RHB W/MNTR: CPT | Performed by: INTERNAL MEDICINE

## 2024-03-04 ENCOUNTER — CLINICAL SUPPORT (OUTPATIENT)
Dept: CARDIAC REHAB | Facility: CLINIC | Age: 64
End: 2024-03-04
Payer: COMMERCIAL

## 2024-03-04 DIAGNOSIS — J84.112 IPF (IDIOPATHIC PULMONARY FIBROSIS) (MULTI): ICD-10-CM

## 2024-03-04 PROCEDURE — 94626 PHY/QHP OP PULM RHB W/MNTR: CPT | Performed by: INTERNAL MEDICINE

## 2024-03-05 ENCOUNTER — CLINICAL SUPPORT (OUTPATIENT)
Dept: CARDIAC REHAB | Facility: CLINIC | Age: 64
End: 2024-03-05
Payer: COMMERCIAL

## 2024-03-05 DIAGNOSIS — J84.112 IPF (IDIOPATHIC PULMONARY FIBROSIS) (MULTI): ICD-10-CM

## 2024-03-05 PROCEDURE — RXMED WILLOW AMBULATORY MEDICATION CHARGE

## 2024-03-05 PROCEDURE — 94626 PHY/QHP OP PULM RHB W/MNTR: CPT | Performed by: INTERNAL MEDICINE

## 2024-03-07 ENCOUNTER — HOSPITAL ENCOUNTER (OUTPATIENT)
Dept: RESPIRATORY THERAPY | Facility: CLINIC | Age: 64
Discharge: HOME | End: 2024-03-07
Payer: COMMERCIAL

## 2024-03-07 ENCOUNTER — APPOINTMENT (OUTPATIENT)
Dept: RESPIRATORY THERAPY | Facility: HOSPITAL | Age: 64
End: 2024-03-07
Payer: COMMERCIAL

## 2024-03-07 DIAGNOSIS — J84.112 IPF (IDIOPATHIC PULMONARY FIBROSIS) (MULTI): ICD-10-CM

## 2024-03-07 PROCEDURE — 94760 N-INVAS EAR/PLS OXIMETRY 1: CPT

## 2024-03-07 PROCEDURE — 94729 DIFFUSING CAPACITY: CPT

## 2024-03-07 PROCEDURE — 94729 DIFFUSING CAPACITY: CPT | Performed by: INTERNAL MEDICINE

## 2024-03-07 PROCEDURE — 98960 EDU&TRN PT SELF-MGMT NQHP 1: CPT

## 2024-03-07 PROCEDURE — 94010 BREATHING CAPACITY TEST: CPT

## 2024-03-07 PROCEDURE — 94010 BREATHING CAPACITY TEST: CPT | Performed by: INTERNAL MEDICINE

## 2024-03-07 PROCEDURE — 94618 PULMONARY STRESS TESTING: CPT

## 2024-03-07 PROCEDURE — 94618 PULMONARY STRESS TESTING: CPT | Performed by: INTERNAL MEDICINE

## 2024-03-08 ENCOUNTER — CLINICAL SUPPORT (OUTPATIENT)
Dept: CARDIAC REHAB | Facility: CLINIC | Age: 64
End: 2024-03-08
Payer: COMMERCIAL

## 2024-03-08 DIAGNOSIS — J84.112 IPF (IDIOPATHIC PULMONARY FIBROSIS) (MULTI): ICD-10-CM

## 2024-03-08 PROCEDURE — 94626 PHY/QHP OP PULM RHB W/MNTR: CPT

## 2024-03-11 ENCOUNTER — CLINICAL SUPPORT (OUTPATIENT)
Dept: CARDIAC REHAB | Facility: CLINIC | Age: 64
End: 2024-03-11
Payer: COMMERCIAL

## 2024-03-11 DIAGNOSIS — J84.112 IPF (IDIOPATHIC PULMONARY FIBROSIS) (MULTI): ICD-10-CM

## 2024-03-11 PROCEDURE — 94626 PHY/QHP OP PULM RHB W/MNTR: CPT | Performed by: INTERNAL MEDICINE

## 2024-03-12 ENCOUNTER — CLINICAL SUPPORT (OUTPATIENT)
Dept: CARDIAC REHAB | Facility: CLINIC | Age: 64
End: 2024-03-12
Payer: COMMERCIAL

## 2024-03-12 DIAGNOSIS — J84.112 IPF (IDIOPATHIC PULMONARY FIBROSIS) (MULTI): ICD-10-CM

## 2024-03-12 PROCEDURE — 94626 PHY/QHP OP PULM RHB W/MNTR: CPT | Performed by: INTERNAL MEDICINE

## 2024-03-13 NOTE — PROGRESS NOTES
Mr. HADLEY LEWIS is a 63 year White M here for FUV IPF     Interim 3/14/2024    He is doing well overall.  He is enrolled in pulmonary rehab.  He is wearing his oxygen in the rehab and at night but not otherwise.  No weight loss or loss of appetite.  The pirfenidone is well-tolerated.  PE:  Wt 105 kg (231 lb)   BMI 30.82 kg/m²   Lungs: Bibasilar crackles  He completed his pulmonary function test, echocardiography, chest CT and sleep study; results are detailed below     Interim 09 27 2023   He continues to do very well he underwent right hip surgery and is recovering nicely.  Lungs: bilateral crackles at bases. POX 95%            Interim 06 21 2023   He is doing overall really well. He denies any cough sputum production wheezing chest tightness. He is about 6 weeks post left hip replacement and feels great. He is scheduled to have his right hip in 2 weeks. He lost about 20 pounds in the last few months intentionally.     Lungs: Bibasilar Velcro crackles. Pulse manually checked 62. He denies any dizziness lightheadedness chest pain.  LFT's April 2023 wnl     interim 03 08 2023      No new symptoms. cough seems better after starting Flonase. January. He is tolerating it very well no significant side effects specifically no GI upset and no significant fatigue     LFTS wnl     History of Present Illness   Mr. Lewis has a persistent non-productive cough since he had an upper respiratory tract infection in December 2019. Prior to that time, he had intermittent cough typically related to sinus symptoms that occurred at season change and responded well to nasal sprays. Mr. Lewis recently had an annual physical and CXR was done that showed abnormalities that was followed up with CT chest. Mr. Lewis walks daily (typically ~3 miles) without limitation. He does report mild shortness of breath if he climbs 2 flights of stairs. He reports seasonal allergies triggered by grass / pollen (worse in spring). cough is manly dry.  Very rare nocturnal awakening. The cough is throughout the day. There is no associated chest tightness or weakness. A few years ago he used to walk 10-20,000 steps but then he cut down to about 7000 steps per day but not necessarily due to shortness of breath but more so due to joints   overuse. No arthritic symptoms, no Raynaud's no problems swallowing. About 15 to 20 pounds in the last 2 years        Mr. Koenig thinks he likely had COVID in December 2021 - his wife tested positive and was symptomatic. He initially tested negative, but developed similar symptoms several days after the test. He tested again later, but was also negative at that time.        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 diffusely distributed  repeat HRCT August 2022 Mild bronchiectasis and bronchiolectasis distributed along both lungs. There is diffuse subpleural reticulation with no definite craniocaudal gradient. Expiratory imaging demonstrates no evidence of significant air-trapping.  01/25/2024 Findings suggesting UIP/IPF somewhat progressed from previous exam. New 7 mm nodule in the left upper lobe.          PFTs:June 2022  Ratio normal FVC in the 50% , DLCO 77  May 2023 FVC 2.76 (52%), DLCO 82 (stable)  09 2023 FVC 2.4, FEV1 :1.9 , DLCO 17 (63%)  03 2024 FVC 2.47 FEV1 :2.1 , DLCO 14 (57%)                     6MWT 09 2023 completed but results not available      Echo 01/2024    1. Left ventricular systolic function is normal with a 60-65% estimated ejection fraction.   2. Spectral Doppler shows an impaired relaxation pattern of left ventricular diastolic filling.   3. There is mild mitral, tricuspid and pulmonic regurgitation.   4. The estimated pulmonary artery pressure is mildly elevated with the RVSP at 44.3 mmHg.       Impression and Recommendations     1.IPF Stable pulmonary function feb 2024 compared to 09/2023 but the progression compared to May 2023. Chest CT jan 2024 slight p;progression compared to 2022   -Continue pirfenidone well tolerated  -Continue pulmonary rehab  -Continue oxygen with exertion and at night    2.Echo with slightly elevated RVSP to 44 and slight dilation of the RV -repeat in 6 to 12 month     3.New 7 mm nodule in the [place]  Follow up Ct in 6 months    4.Moderate KIM   Refer to sleep medicine

## 2024-03-14 ENCOUNTER — OFFICE VISIT (OUTPATIENT)
Dept: PULMONOLOGY | Facility: HOSPITAL | Age: 64
End: 2024-03-14
Payer: COMMERCIAL

## 2024-03-14 ENCOUNTER — SPECIALTY PHARMACY (OUTPATIENT)
Dept: PHARMACY | Facility: CLINIC | Age: 64
End: 2024-03-14

## 2024-03-14 VITALS — BODY MASS INDEX: 30.82 KG/M2 | WEIGHT: 231 LBS

## 2024-03-14 DIAGNOSIS — J84.112 IPF (IDIOPATHIC PULMONARY FIBROSIS) (MULTI): Primary | ICD-10-CM

## 2024-03-14 PROCEDURE — 99214 OFFICE O/P EST MOD 30 MIN: CPT | Performed by: INTERNAL MEDICINE

## 2024-03-14 PROCEDURE — 1160F RVW MEDS BY RX/DR IN RCRD: CPT | Performed by: INTERNAL MEDICINE

## 2024-03-14 PROCEDURE — 1036F TOBACCO NON-USER: CPT | Performed by: INTERNAL MEDICINE

## 2024-03-14 PROCEDURE — 1126F AMNT PAIN NOTED NONE PRSNT: CPT | Performed by: INTERNAL MEDICINE

## 2024-03-14 PROCEDURE — 3008F BODY MASS INDEX DOCD: CPT | Performed by: INTERNAL MEDICINE

## 2024-03-14 PROCEDURE — 1159F MED LIST DOCD IN RCRD: CPT | Performed by: INTERNAL MEDICINE

## 2024-03-14 SDOH — ECONOMIC STABILITY: FOOD INSECURITY: WITHIN THE PAST 12 MONTHS, THE FOOD YOU BOUGHT JUST DIDN'T LAST AND YOU DIDN'T HAVE MONEY TO GET MORE.: NEVER TRUE

## 2024-03-14 SDOH — ECONOMIC STABILITY: FOOD INSECURITY: WITHIN THE PAST 12 MONTHS, YOU WORRIED THAT YOUR FOOD WOULD RUN OUT BEFORE YOU GOT MONEY TO BUY MORE.: NEVER TRUE

## 2024-03-14 ASSESSMENT — PATIENT HEALTH QUESTIONNAIRE - PHQ9
SUM OF ALL RESPONSES TO PHQ9 QUESTIONS 1 & 2: 0
1. LITTLE INTEREST OR PLEASURE IN DOING THINGS: NOT AT ALL
2. FEELING DOWN, DEPRESSED OR HOPELESS: NOT AT ALL

## 2024-03-14 ASSESSMENT — LIFESTYLE VARIABLES
AUDIT-C TOTAL SCORE: 3
SKIP TO QUESTIONS 9-10: 1
HOW OFTEN DO YOU HAVE A DRINK CONTAINING ALCOHOL: 2-3 TIMES A WEEK
HOW OFTEN DO YOU HAVE SIX OR MORE DRINKS ON ONE OCCASION: NEVER
HOW MANY STANDARD DRINKS CONTAINING ALCOHOL DO YOU HAVE ON A TYPICAL DAY: 1 OR 2

## 2024-03-14 ASSESSMENT — PAIN SCALES - GENERAL: PAINLEVEL: 0-NO PAIN

## 2024-03-14 NOTE — PATIENT INSTRUCTIONS
Dear Micky Koenig It was nice seeing you today. We discussed the following:     You are doing well and you breathing test numbers are stable compared to September 2023.  Your CAT scan showed mild progression compared to August 2022 and a new nodule that we need to keep an eye on  Continue the pirfenidone  Continue pulmonary rehab  Wear your oxygen with activity and at night  Please have your liver function test checked with your physical lab  You have moderate sleep apnea and I made referral to the sleep specialist  Your echo showed mild pressure inside the right side of the heart and we will monitor this by repeating an echo in 1 year      For scheduling purposes:    Call [phone] to schedule a breathing or a walking test     Call [phone] to schedule  EKG's, Echocardiograms and Cardiopulmonary Stress Tests.    Call [phone] to schedule Radiology tests such as Nuclear Medicine Stress Tests, CT Scans, and MRI's.    Should you have any questions Please Call my assistant Ava Recio at [phone] or our pulmonary nurse Ashley Birmingham [phone].

## 2024-03-15 ENCOUNTER — CLINICAL SUPPORT (OUTPATIENT)
Dept: CARDIAC REHAB | Facility: CLINIC | Age: 64
End: 2024-03-15
Payer: COMMERCIAL

## 2024-03-15 DIAGNOSIS — J84.112 IPF (IDIOPATHIC PULMONARY FIBROSIS) (MULTI): ICD-10-CM

## 2024-03-15 PROCEDURE — 94626 PHY/QHP OP PULM RHB W/MNTR: CPT | Performed by: INTERNAL MEDICINE

## 2024-03-18 ENCOUNTER — CLINICAL SUPPORT (OUTPATIENT)
Dept: CARDIAC REHAB | Facility: CLINIC | Age: 64
End: 2024-03-18
Payer: COMMERCIAL

## 2024-03-18 ENCOUNTER — PHARMACY VISIT (OUTPATIENT)
Dept: PHARMACY | Facility: CLINIC | Age: 64
End: 2024-03-18
Payer: COMMERCIAL

## 2024-03-18 DIAGNOSIS — J84.112 IPF (IDIOPATHIC PULMONARY FIBROSIS) (MULTI): ICD-10-CM

## 2024-03-18 PROCEDURE — 94626 PHY/QHP OP PULM RHB W/MNTR: CPT | Performed by: INTERNAL MEDICINE

## 2024-03-19 ENCOUNTER — CLINICAL SUPPORT (OUTPATIENT)
Dept: CARDIAC REHAB | Facility: CLINIC | Age: 64
End: 2024-03-19
Payer: COMMERCIAL

## 2024-03-19 DIAGNOSIS — J84.112 IPF (IDIOPATHIC PULMONARY FIBROSIS) (MULTI): ICD-10-CM

## 2024-03-19 PROCEDURE — 94626 PHY/QHP OP PULM RHB W/MNTR: CPT | Performed by: INTERNAL MEDICINE

## 2024-03-22 ENCOUNTER — CLINICAL SUPPORT (OUTPATIENT)
Dept: CARDIAC REHAB | Facility: CLINIC | Age: 64
End: 2024-03-22
Payer: COMMERCIAL

## 2024-03-22 DIAGNOSIS — J84.112 IPF (IDIOPATHIC PULMONARY FIBROSIS) (MULTI): ICD-10-CM

## 2024-03-22 PROCEDURE — 94626 PHY/QHP OP PULM RHB W/MNTR: CPT | Performed by: INTERNAL MEDICINE

## 2024-03-25 ENCOUNTER — CLINICAL SUPPORT (OUTPATIENT)
Dept: CARDIAC REHAB | Facility: CLINIC | Age: 64
End: 2024-03-25
Payer: COMMERCIAL

## 2024-03-25 DIAGNOSIS — J84.112 IPF (IDIOPATHIC PULMONARY FIBROSIS) (MULTI): ICD-10-CM

## 2024-03-25 PROCEDURE — 94626 PHY/QHP OP PULM RHB W/MNTR: CPT | Performed by: INTERNAL MEDICINE

## 2024-03-26 ENCOUNTER — CLINICAL SUPPORT (OUTPATIENT)
Dept: CARDIAC REHAB | Facility: CLINIC | Age: 64
End: 2024-03-26
Payer: COMMERCIAL

## 2024-03-26 ENCOUNTER — APPOINTMENT (OUTPATIENT)
Dept: PULMONOLOGY | Facility: HOSPITAL | Age: 64
End: 2024-03-26
Payer: COMMERCIAL

## 2024-03-26 DIAGNOSIS — J84.112 IPF (IDIOPATHIC PULMONARY FIBROSIS) (MULTI): ICD-10-CM

## 2024-03-26 PROCEDURE — 94626 PHY/QHP OP PULM RHB W/MNTR: CPT | Performed by: INTERNAL MEDICINE

## 2024-03-27 ENCOUNTER — DOCUMENTATION (OUTPATIENT)
Dept: CARDIAC REHAB | Facility: CLINIC | Age: 64
End: 2024-03-27
Payer: COMMERCIAL

## 2024-03-27 NOTE — PROGRESS NOTES
Pulmonary Rehab 60 day Reassessment    Name: Micky Koenig  Medical Record Number: [id number]  YOB: 1960    Today’s Date: 3/27/2024  Primary Care Physician: Viktor Muñiz MD  Referring Physician: Hugo Ayon MD  Program Location: [place]  [facility]  Primary Diagnosis: ILD  Date of Diagnosis: 4/22/22  Session #: 20    Oxygen Assessment  SP02 rest: 97 % R/A  SP02 exertion: 89-90% with 2L N/C with rehab exercise  Oxygen Use  Requires supplemental O2: Yes, exertional   Liters at Rest: NA   Liters with Exertion: 2L N/C  Using O2 as prescribed: Using with exercise and at night  Oxygen (Supplemental 02 use only)  Demonstrates appropriate knowledge of 02 use? Yes  Demonstrates knowledge of 02 safety? Yes  Home 02 system: concentrator  Portable 02:   Hypoxemia managed?: Yes  Home Pulse Oximeter?: Yes    MMRC Survey score:  Intake: 1  Discharge:     Goal Status:  In Progress  Oxygen Goals: Decrease MMRC score, Learn and use diaphragmatic breathing as needed,  Learn and use pursed lip breathing as needed, Titrate supplemental O2 as needed to keep SpO2 at 88% or greater.  Oxygen Interventions:      Psychosocial Assessment  Pt reported/currently experiencing:  concerned about diagnosis  Currently seeing a mental health provider: No  Social Support:  Yue, spouse  PHQ-9 Survey Score:   Intake: 0  Discharge:     CAT Survey Score:  Intake: 7  Discharge:  Learning assessment/barriers: Work and None  Preferred learning method: Visual   Stages of Change:Preparation    Goal Status: In Progress  Psychosocial Goals: Decrease CAT score, Decrease PHQ-9 score.  Psychosocial Interventions/Education:   2/2/24 reviewed initial PHQ-9 score w/ pt/MS  3/26/24 No psychosocial concerns voiced to staff/mk    Nutrition Assessment:  Current Dietary Guidelines: no special diet  Barriers to dietary change: No   Diet Habit Survey: Picture Your Plate   Intake: 67  Discharge:   Diabetes Assessment  Diabetes:No  Weight Management  Height: 73  in  Weight: 236  BMI: 31.1   Goal Status: In Progress  Nutrition Goals: Increase PYP to a score greater than 60  Nutrition Interventions/Education:   2/2/24 Attended label reading lecture, handout given/SAI  2/9/24 Reviewed PYP  w/ pt.  Encouraged pt to read labels for sodium and added sugars/SAI    Exercise Assessment  Current Home Exercise: Yes   Mode: walking,weather permitting  Days/Week: 2  Min/Day: 30    6-minute Walk Assessment:   Intake: 6-MWT distance (meters): 420     FiO2: 1L N/C     SPO2: 87% placed on 2L N/C  Discharge: 6-MWT distance (meters):      FiO2:     SPO2:    Exercise Prescription:    Based on: {6-min walk test   Frequency:  3 days/week   Mode: Treadmill, NuStep, and Recumbent Cycle   Duration: 30 total aerobic minutes   Intensity: RPE 12-14       Target HR RPE/RPD       Max METS 2.9       SpO2 Range 88 to 100 %       O2 Flow Rate 1 to 2 L/min prn     Modality METS Load Duration  minutes   1 Pre-Exercise   2   2 Treadmill  3.7 2.6 mph 13   3 Nustep 4000  4.4 Load 6  120 vasquez 13   4 Recumbent Cycle  4.9 Load 7  65 rpm 13   5 Post-Exercise   5     Resistance Training: No   Home Exercise Prescription given: Discussed home exercise  Goal Status: In Progress  Exercise Goals: To increase MET level by 5-10% each week, To increase total exercise duration to 30-45 minutes  Exercise Interventions/Education:   3/19/24 Attended exercise rx and maint lecture. Handout given for home review/MS  3/22/24 Pt has local fitness center membership. Discussed adding weight training at least 2 days a week/mk  Other Core Components/Risk Factor Assessment:    Medication adherence:  Current Medications:    Current Outpatient Medications   Medication Sig Dispense Refill    acetaminophen (Tylenol) 500 mg tablet Take 1 tablet (500 mg) by mouth every 6 hours if needed for mild pain (1 - 3).      aspirin 81 mg EC tablet Take 1 tablet (81 mg) by mouth once daily.      atorvastatin (Lipitor) 20 mg tablet Take 1 tablet (20 mg) by  mouth once daily. 90 tablet 1    cetirizine (ZyrTEC) 10 mg tablet Take 1 tablet (10 mg) by mouth once daily.      ipratropium (Atrovent) 21 mcg (0.03 %) nasal spray Administer 1 spray into each nostril 3 times a day. 30 mL 0    pirfenidone (Esbriet) 801 mg tablet tablet TAKE ONE (1) TABLET BY MOUTH THREE TIMES DAILY. 270 tablet 0    sodium,potassium,mag sulfates (Suprep) 17.5-3.13-1.6 gram recon soln solution Mix and drink 1 bottle according to package instructions at 6pm the night before procedure, then mix and drink the second bottle at 4am the day of procedure. (Patient not taking: Reported on 3/14/2024) 354 mL 0     No current facility-administered medications for this visit.     Allergies:    Allergies   Allergen Reactions    Pollen Extracts Itching       Taking medications as prescribed: Yes      Uses pill box/organizer: No    Carries medication list: No     Blood Pressure Management:  Hx of Hypertension: No   Resting BP: 120/66    Smoking/Tobacco Assessment;    Social History     Tobacco Use   Smoking Status Never   Smokeless Tobacco Never       Goal Status: In Progress  Other Core Component Goals: Improve knowledge of lung disease, Manage bronchial hygiene  Other Core Component Interventions/Education:     # of hospital admissions due to lung problems: 0  # of ER visits due to lung problems: 0    Individual Patient Goals:  Walk 60 minutes prior discharge  General Comments:  Mr Koenig has been progressing well with his pulmonary rehab. Interval training encouraged and helpful with higher MET levels. Continues to utilize 2L N/C with exercise to keep saturations greater than 88%. No resp complaints voiced. Has been encouraged to add upper and lower body resistance training to current program.       Rehab Staff Signature: Jennifer Reina RN

## 2024-03-28 ENCOUNTER — SPECIALTY PHARMACY (OUTPATIENT)
Dept: PHARMACY | Facility: CLINIC | Age: 64
End: 2024-03-28

## 2024-03-29 ENCOUNTER — CLINICAL SUPPORT (OUTPATIENT)
Dept: CARDIAC REHAB | Facility: CLINIC | Age: 64
End: 2024-03-29
Payer: COMMERCIAL

## 2024-03-29 DIAGNOSIS — J84.112 IPF (IDIOPATHIC PULMONARY FIBROSIS) (MULTI): ICD-10-CM

## 2024-03-29 DIAGNOSIS — J30.9 ALLERGIC RHINITIS, UNSPECIFIED SEASONALITY, UNSPECIFIED TRIGGER: ICD-10-CM

## 2024-03-29 PROCEDURE — 94626 PHY/QHP OP PULM RHB W/MNTR: CPT | Performed by: INTERNAL MEDICINE

## 2024-03-29 RX ORDER — IPRATROPIUM BROMIDE 21 UG/1
1 SPRAY, METERED NASAL 3 TIMES DAILY
Qty: 30 ML | Refills: 0 | Status: SHIPPED | OUTPATIENT
Start: 2024-03-29 | End: 2024-04-01 | Stop reason: SDUPTHER

## 2024-03-30 ENCOUNTER — LAB (OUTPATIENT)
Dept: LAB | Facility: LAB | Age: 64
End: 2024-03-30
Payer: COMMERCIAL

## 2024-03-30 DIAGNOSIS — E78.00 PURE HYPERCHOLESTEROLEMIA: ICD-10-CM

## 2024-03-30 DIAGNOSIS — R73.9 HYPERGLYCEMIA: ICD-10-CM

## 2024-03-30 DIAGNOSIS — I25.10 ATHEROSCLEROSIS OF NATIVE CORONARY ARTERY OF NATIVE HEART WITHOUT ANGINA PECTORIS: ICD-10-CM

## 2024-03-30 LAB
ALBUMIN SERPL BCP-MCNC: 4.3 G/DL (ref 3.4–5)
ALP SERPL-CCNC: 53 U/L (ref 33–136)
ALT SERPL W P-5'-P-CCNC: 10 U/L (ref 10–52)
ANION GAP SERPL CALC-SCNC: 11 MMOL/L (ref 10–20)
AST SERPL W P-5'-P-CCNC: 27 U/L (ref 9–39)
BILIRUB SERPL-MCNC: 0.5 MG/DL (ref 0–1.2)
BUN SERPL-MCNC: 16 MG/DL (ref 6–23)
CALCIUM SERPL-MCNC: 9.8 MG/DL (ref 8.6–10.6)
CHLORIDE SERPL-SCNC: 101 MMOL/L (ref 98–107)
CHOLEST SERPL-MCNC: 202 MG/DL (ref 0–199)
CHOLESTEROL/HDL RATIO: 3.1
CO2 SERPL-SCNC: 33 MMOL/L (ref 21–32)
CREAT SERPL-MCNC: 0.81 MG/DL (ref 0.5–1.3)
EGFRCR SERPLBLD CKD-EPI 2021: >90 ML/MIN/1.73M*2
EST. AVERAGE GLUCOSE BLD GHB EST-MCNC: 100 MG/DL
GLUCOSE SERPL-MCNC: 95 MG/DL (ref 74–99)
HBA1C MFR BLD: 5.1 %
HDLC SERPL-MCNC: 64.7 MG/DL
LDLC SERPL CALC-MCNC: 123 MG/DL
NON HDL CHOLESTEROL: 137 MG/DL (ref 0–149)
POTASSIUM SERPL-SCNC: 4.9 MMOL/L (ref 3.5–5.3)
PROT SERPL-MCNC: 6.7 G/DL (ref 6.4–8.2)
SODIUM SERPL-SCNC: 140 MMOL/L (ref 136–145)
TRIGL SERPL-MCNC: 73 MG/DL (ref 0–149)
VLDL: 15 MG/DL (ref 0–40)

## 2024-03-30 PROCEDURE — 80053 COMPREHEN METABOLIC PANEL: CPT

## 2024-03-30 PROCEDURE — 80061 LIPID PANEL: CPT

## 2024-03-30 PROCEDURE — 36415 COLL VENOUS BLD VENIPUNCTURE: CPT

## 2024-03-30 PROCEDURE — 83036 HEMOGLOBIN GLYCOSYLATED A1C: CPT

## 2024-04-01 ENCOUNTER — CLINICAL SUPPORT (OUTPATIENT)
Dept: CARDIAC REHAB | Facility: CLINIC | Age: 64
End: 2024-04-01
Payer: COMMERCIAL

## 2024-04-01 DIAGNOSIS — J84.112 IPF (IDIOPATHIC PULMONARY FIBROSIS) (MULTI): ICD-10-CM

## 2024-04-01 DIAGNOSIS — J30.9 ALLERGIC RHINITIS, UNSPECIFIED SEASONALITY, UNSPECIFIED TRIGGER: ICD-10-CM

## 2024-04-01 PROCEDURE — RXMED WILLOW AMBULATORY MEDICATION CHARGE

## 2024-04-01 PROCEDURE — 94626 PHY/QHP OP PULM RHB W/MNTR: CPT | Performed by: INTERNAL MEDICINE

## 2024-04-02 ENCOUNTER — PHARMACY VISIT (OUTPATIENT)
Dept: PHARMACY | Facility: CLINIC | Age: 64
End: 2024-04-02
Payer: COMMERCIAL

## 2024-04-02 ENCOUNTER — CLINICAL SUPPORT (OUTPATIENT)
Dept: CARDIAC REHAB | Facility: CLINIC | Age: 64
End: 2024-04-02
Payer: COMMERCIAL

## 2024-04-02 DIAGNOSIS — J84.112 IPF (IDIOPATHIC PULMONARY FIBROSIS) (MULTI): ICD-10-CM

## 2024-04-02 PROCEDURE — 94626 PHY/QHP OP PULM RHB W/MNTR: CPT | Performed by: INTERNAL MEDICINE

## 2024-04-02 RX ORDER — IPRATROPIUM BROMIDE 21 UG/1
1 SPRAY, METERED NASAL 3 TIMES DAILY
Qty: 30 ML | Refills: 1 | Status: SHIPPED | OUTPATIENT
Start: 2024-04-02

## 2024-04-06 ENCOUNTER — OFFICE VISIT (OUTPATIENT)
Dept: PRIMARY CARE | Facility: CLINIC | Age: 64
End: 2024-04-06
Payer: COMMERCIAL

## 2024-04-06 VITALS
HEIGHT: 72 IN | BODY MASS INDEX: 31.02 KG/M2 | HEART RATE: 73 BPM | WEIGHT: 229 LBS | DIASTOLIC BLOOD PRESSURE: 71 MMHG | SYSTOLIC BLOOD PRESSURE: 127 MMHG | OXYGEN SATURATION: 90 %

## 2024-04-06 DIAGNOSIS — Z12.5 PROSTATE CANCER SCREENING: ICD-10-CM

## 2024-04-06 DIAGNOSIS — Z00.00 ROUTINE GENERAL MEDICAL EXAMINATION AT HEALTH CARE FACILITY: ICD-10-CM

## 2024-04-06 DIAGNOSIS — Z00.00 ROUTINE GENERAL MEDICAL EXAMINATION AT A HEALTH CARE FACILITY: ICD-10-CM

## 2024-04-06 DIAGNOSIS — M16.7 OTHER SECONDARY OSTEOARTHRITIS OF LEFT HIP: ICD-10-CM

## 2024-04-06 DIAGNOSIS — E78.00 PURE HYPERCHOLESTEROLEMIA: ICD-10-CM

## 2024-04-06 DIAGNOSIS — J84.112 IPF (IDIOPATHIC PULMONARY FIBROSIS) (MULTI): Primary | ICD-10-CM

## 2024-04-06 PROCEDURE — 1159F MED LIST DOCD IN RCRD: CPT | Performed by: INTERNAL MEDICINE

## 2024-04-06 PROCEDURE — 99397 PER PM REEVAL EST PAT 65+ YR: CPT | Performed by: INTERNAL MEDICINE

## 2024-04-06 PROCEDURE — 3008F BODY MASS INDEX DOCD: CPT | Performed by: INTERNAL MEDICINE

## 2024-04-06 PROCEDURE — 1126F AMNT PAIN NOTED NONE PRSNT: CPT | Performed by: INTERNAL MEDICINE

## 2024-04-06 PROCEDURE — 1160F RVW MEDS BY RX/DR IN RCRD: CPT | Performed by: INTERNAL MEDICINE

## 2024-04-06 PROCEDURE — 93000 ELECTROCARDIOGRAM COMPLETE: CPT | Performed by: INTERNAL MEDICINE

## 2024-04-06 RX ORDER — PNEUMOCOCCAL 20-VALENT CONJUGATE VACCINE 2.2; 2.2; 2.2; 2.2; 2.2; 2.2; 2.2; 2.2; 2.2; 2.2; 2.2; 2.2; 2.2; 2.2; 2.2; 2.2; 4.4; 2.2; 2.2; 2.2 UG/.5ML; UG/.5ML; UG/.5ML; UG/.5ML; UG/.5ML; UG/.5ML; UG/.5ML; UG/.5ML; UG/.5ML; UG/.5ML; UG/.5ML; UG/.5ML; UG/.5ML; UG/.5ML; UG/.5ML; UG/.5ML; UG/.5ML; UG/.5ML; UG/.5ML; UG/.5ML
0.5 INJECTION, SUSPENSION INTRAMUSCULAR ONCE
Qty: 0.5 ML | Refills: 0 | Status: SHIPPED | OUTPATIENT
Start: 2024-04-06 | End: 2024-06-12

## 2024-04-06 ASSESSMENT — COLUMBIA-SUICIDE SEVERITY RATING SCALE - C-SSRS: 1. IN THE PAST MONTH, HAVE YOU WISHED YOU WERE DEAD OR WISHED YOU COULD GO TO SLEEP AND NOT WAKE UP?: NO

## 2024-04-06 ASSESSMENT — PAIN SCALES - GENERAL: PAINLEVEL: 0-NO PAIN

## 2024-04-06 NOTE — PROGRESS NOTES
Subjective   Patient ID: Nilton Koenig is a 63 y.o. male who presents for Annual Exam.    HPI patient presents to clinic for annual physical examination.  He is also here for follow-up visit on history of  severe DJD of hips, allergic rhinitis, IPF, coronary atherosclerosis, mild obesity and dyslipidemia,s/p  left direct anterior total hip arthroplasty on 5/1/2023 and right direct anterior total hip arthroplasty on 8/7/2023 secondary to severe degenerative joint disease of hips.  He is doing fairly well and denies any cough, congestion, shortness of breath, swelling of legs and palpitation.  Laboratory studies done shows serum cholesterol of 202 ,, LDL of 123 and other studies are unremarkable.  EKG done shows sinus rhythm at 64 bpm with normal axis, normal intervals incomplete right bundle branch block without any ischemic changes.     Review of Systems   Constitutional: Negative.    HENT: Negative.     Eyes: Negative.    Respiratory: Negative.     Cardiovascular: Negative.    Gastrointestinal: Negative.    Endocrine: Negative.    Genitourinary: Negative.    Musculoskeletal: Negative.    Skin: Negative.    Allergic/Immunologic: Negative.    Neurological: Negative.    Hematological: Negative.    Psychiatric/Behavioral: Negative.         Objective   /71   Pulse 73   Ht 1.829 m (6')   Wt 104 kg (229 lb)   SpO2 90%   BMI 31.06 kg/m²     Physical Exam  Constitutional:       Appearance: Normal appearance. He is obese.   HENT:      Right Ear: Tympanic membrane normal.      Left Ear: Tympanic membrane and ear canal normal.      Nose: Nose normal.   Neck:      Vascular: No carotid bruit.   Cardiovascular:      Rate and Rhythm: Normal rate.   Pulmonary:      Effort: No respiratory distress.      Breath sounds: No stridor. No wheezing.   Abdominal:      Palpations: Abdomen is soft.      Tenderness: There is no abdominal tenderness. There is no guarding or rebound.   Skin:     Coloration: Skin is not jaundiced.       Findings: No bruising.   Neurological:      General: No focal deficit present.      Mental Status: He is alert and oriented to person, place, and time.   Psychiatric:         Mood and Affect: Mood normal.         Assessment/Plan will be given prescription for Prevnar 20 for health maintenance.  He will continue other medications and will return to clinic in 6 months for follow-up visit.  He will continue to follow-up with pulmonary clinic regarding history of idiopathic pulmonary fibrosis.

## 2024-04-08 ENCOUNTER — CLINICAL SUPPORT (OUTPATIENT)
Dept: CARDIAC REHAB | Facility: CLINIC | Age: 64
End: 2024-04-08
Payer: COMMERCIAL

## 2024-04-08 DIAGNOSIS — J84.112 IPF (IDIOPATHIC PULMONARY FIBROSIS) (MULTI): ICD-10-CM

## 2024-04-08 PROCEDURE — 94626 PHY/QHP OP PULM RHB W/MNTR: CPT | Performed by: INTERNAL MEDICINE

## 2024-04-09 ENCOUNTER — CLINICAL SUPPORT (OUTPATIENT)
Dept: CARDIAC REHAB | Facility: CLINIC | Age: 64
End: 2024-04-09
Payer: COMMERCIAL

## 2024-04-09 DIAGNOSIS — J84.112 IPF (IDIOPATHIC PULMONARY FIBROSIS) (MULTI): ICD-10-CM

## 2024-04-09 PROCEDURE — 94626 PHY/QHP OP PULM RHB W/MNTR: CPT | Performed by: INTERNAL MEDICINE

## 2024-04-10 ASSESSMENT — ENCOUNTER SYMPTOMS
NEUROLOGICAL NEGATIVE: 1
CONSTITUTIONAL NEGATIVE: 1
MUSCULOSKELETAL NEGATIVE: 1
ENDOCRINE NEGATIVE: 1
CARDIOVASCULAR NEGATIVE: 1
HEMATOLOGIC/LYMPHATIC NEGATIVE: 1
RESPIRATORY NEGATIVE: 1
ALLERGIC/IMMUNOLOGIC NEGATIVE: 1
GASTROINTESTINAL NEGATIVE: 1
EYES NEGATIVE: 1
PSYCHIATRIC NEGATIVE: 1

## 2024-04-12 ENCOUNTER — CLINICAL SUPPORT (OUTPATIENT)
Dept: CARDIAC REHAB | Facility: CLINIC | Age: 64
End: 2024-04-12
Payer: COMMERCIAL

## 2024-04-12 DIAGNOSIS — J84.112 IPF (IDIOPATHIC PULMONARY FIBROSIS) (MULTI): ICD-10-CM

## 2024-04-12 DIAGNOSIS — D86.9 SARCOIDOSIS: ICD-10-CM

## 2024-04-12 PROCEDURE — 94626 PHY/QHP OP PULM RHB W/MNTR: CPT | Performed by: INTERNAL MEDICINE

## 2024-04-12 RX ORDER — PIRFENIDONE 801 MG/1
TABLET, FILM COATED ORAL
Qty: 270 TABLET | Refills: 0 | Status: CANCELLED | OUTPATIENT
Start: 2024-04-12 | End: 2025-04-12

## 2024-04-15 ENCOUNTER — CLINICAL SUPPORT (OUTPATIENT)
Dept: CARDIAC REHAB | Facility: CLINIC | Age: 64
End: 2024-04-15
Payer: COMMERCIAL

## 2024-04-15 DIAGNOSIS — J84.112 IPF (IDIOPATHIC PULMONARY FIBROSIS) (MULTI): ICD-10-CM

## 2024-04-15 PROCEDURE — 94626 PHY/QHP OP PULM RHB W/MNTR: CPT | Performed by: INTERNAL MEDICINE

## 2024-04-16 ENCOUNTER — CLINICAL SUPPORT (OUTPATIENT)
Dept: CARDIAC REHAB | Facility: CLINIC | Age: 64
End: 2024-04-16
Payer: COMMERCIAL

## 2024-04-16 DIAGNOSIS — D86.9 SARCOIDOSIS: ICD-10-CM

## 2024-04-16 DIAGNOSIS — J84.112 IPF (IDIOPATHIC PULMONARY FIBROSIS) (MULTI): ICD-10-CM

## 2024-04-16 PROCEDURE — 94626 PHY/QHP OP PULM RHB W/MNTR: CPT | Performed by: INTERNAL MEDICINE

## 2024-04-17 PROCEDURE — RXMED WILLOW AMBULATORY MEDICATION CHARGE

## 2024-04-17 RX ORDER — PIRFENIDONE 801 MG/1
TABLET, FILM COATED ORAL
Qty: 270 TABLET | Refills: 0 | Status: SHIPPED | OUTPATIENT
Start: 2024-04-17 | End: 2024-06-11 | Stop reason: SDUPTHER

## 2024-04-17 NOTE — TELEPHONE ENCOUNTER
Pt reached out via email regarding needing a refill for his pirfenidone. Refill order placed and routed to Dr. Ayon to sign.

## 2024-04-18 ENCOUNTER — SPECIALTY PHARMACY (OUTPATIENT)
Dept: PHARMACY | Facility: CLINIC | Age: 64
End: 2024-04-18

## 2024-04-18 ENCOUNTER — PHARMACY VISIT (OUTPATIENT)
Dept: PHARMACY | Facility: CLINIC | Age: 64
End: 2024-04-18
Payer: COMMERCIAL

## 2024-04-19 ENCOUNTER — CLINICAL SUPPORT (OUTPATIENT)
Dept: CARDIAC REHAB | Facility: CLINIC | Age: 64
End: 2024-04-19
Payer: COMMERCIAL

## 2024-04-19 DIAGNOSIS — J84.112 IPF (IDIOPATHIC PULMONARY FIBROSIS) (MULTI): ICD-10-CM

## 2024-04-19 PROCEDURE — 94626 PHY/QHP OP PULM RHB W/MNTR: CPT | Performed by: INTERNAL MEDICINE

## 2024-04-22 ENCOUNTER — CLINICAL SUPPORT (OUTPATIENT)
Dept: CARDIAC REHAB | Facility: CLINIC | Age: 64
End: 2024-04-22
Payer: COMMERCIAL

## 2024-04-22 DIAGNOSIS — J84.112 IPF (IDIOPATHIC PULMONARY FIBROSIS) (MULTI): ICD-10-CM

## 2024-04-22 PROCEDURE — 94626 PHY/QHP OP PULM RHB W/MNTR: CPT | Performed by: INTERNAL MEDICINE

## 2024-04-23 ENCOUNTER — CLINICAL SUPPORT (OUTPATIENT)
Dept: CARDIAC REHAB | Facility: CLINIC | Age: 64
End: 2024-04-23
Payer: COMMERCIAL

## 2024-04-23 DIAGNOSIS — J84.112 IPF (IDIOPATHIC PULMONARY FIBROSIS) (MULTI): ICD-10-CM

## 2024-04-23 PROCEDURE — 94626 PHY/QHP OP PULM RHB W/MNTR: CPT | Performed by: INTERNAL MEDICINE

## 2024-04-26 ENCOUNTER — CLINICAL SUPPORT (OUTPATIENT)
Dept: CARDIAC REHAB | Facility: CLINIC | Age: 64
End: 2024-04-26
Payer: COMMERCIAL

## 2024-04-26 DIAGNOSIS — J84.112 IPF (IDIOPATHIC PULMONARY FIBROSIS) (MULTI): ICD-10-CM

## 2024-04-26 PROCEDURE — 94626 PHY/QHP OP PULM RHB W/MNTR: CPT | Performed by: INTERNAL MEDICINE

## 2024-04-29 ENCOUNTER — DOCUMENTATION (OUTPATIENT)
Dept: CARDIAC REHAB | Facility: CLINIC | Age: 64
End: 2024-04-29
Payer: COMMERCIAL

## 2024-04-29 ENCOUNTER — CLINICAL SUPPORT (OUTPATIENT)
Dept: CARDIAC REHAB | Facility: CLINIC | Age: 64
End: 2024-04-29
Payer: COMMERCIAL

## 2024-04-29 DIAGNOSIS — J84.112 IPF (IDIOPATHIC PULMONARY FIBROSIS) (MULTI): ICD-10-CM

## 2024-04-29 PROCEDURE — 94626 PHY/QHP OP PULM RHB W/MNTR: CPT | Performed by: INTERNAL MEDICINE

## 2024-04-29 NOTE — PROGRESS NOTES
Please see [facility] for daily session report   What Is The Reason For Today's Visit?: History of Non-Melanoma Skin Cancer How Many Skin Cancers Have You Had?: one When Was Your Last Cancer Diagnosed?: 2022

## 2024-04-29 NOTE — PROGRESS NOTES
Pulmonary Rehab 90 day Reassessment    Name: Micky Koenig  Medical Record Number: [id number]  YOB: 1960    Today’s Date: 4/29/2024  Primary Care Physician: Viktor Muñiz MD  Referring Physician: Hugo Ayon MD  Program Location: [place]  General  Primary Diagnosis: ILD  Date of Diagnosis: 4/22/22  Session #: 32    Oxygen Assessment  SP02 rest: 97 % R/A  SP02 exertion: 89-90% with 2L N/C with rehab exercise  Oxygen Use  Requires supplemental O2: Yes, exertional   Liters at Rest: NA   Liters with Exertion: 2L N/C  Using O2 as prescribed: Using with exercise and at night  Oxygen (Supplemental 02 use only)  Demonstrates appropriate knowledge of 02 use? Yes  Demonstrates knowledge of 02 safety? Yes  Home 02 system: concentrator  Portable 02:   Hypoxemia managed?: Yes  Home Pulse Oximeter?: Yes    MMRC Survey score:  Intake: 1  Discharge:     Goal Status:  In Progress  Oxygen Goals: Decrease MMRC score, Learn and use diaphragmatic breathing as needed,  Learn and use pursed lip breathing as needed, Titrate supplemental O2 as needed to keep SpO2 at 88% or greater.  Oxygen Interventions:  2/5/24 DE book given to pt/MK  2/27/24 ILD book given to pt/MK  4/2/24 reviewed oxygen safety/MK      Psychosocial Assessment  Pt reported/currently experiencing:  concerned about diagnosis  Currently seeing a mental health provider: No  Social Support:  Yue, spouse  PHQ-9 Survey Score:   Intake: 0  Discharge:     CAT Survey Score:  Intake: 7  Discharge:  Learning assessment/barriers: Work and None  Preferred learning method: Visual   Stages of Change:  Action    Goal Status: In Progress  Psychosocial Goals: Decrease CAT score, Decrease PHQ-9 score.  Psychosocial Interventions/Education:   2/2/24 reviewed initial PHQ-9 score w/ pt/MS  3/26/24 No psychosocial concerns voiced to staff/mk  4/29/24 pt states he has a strong social system/MS    Nutrition Assessment:  Current Dietary Guidelines: no special diet  Barriers to  "dietary change: No   Diet Habit Survey: Picture Your Plate   Intake: 67  Discharge:   Diabetes Assessment  Diabetes:No  Weight Management  Height: 73 in  Weight: 236  BMI: 31.1   Goal Status: In Progress  Nutrition Goals: Increase PYP to a score greater than 60  Nutrition Interventions/Education:   2/2/24 Attended label reading lecture, handout given/SAI  2/9/24 Reviewed PYP  w/ pt.  Encouraged pt to read labels for sodium and added sugars/SAI  3/1/24 attended heart healthy diet education class, handout given/SAI  3/8/24 \"what about eating out\" handout given/SAI  3/18/24 reviewed and gave handouts on pulmonary nutrition w/ pt/SAI  4/23/24 reviewed lipid profile, handout given/    Exercise Assessment  Current Home Exercise: Yes   Mode: walking,weather permitting  Days/Week: 2  Min/Day: 30    6-minute Walk Assessment:   Intake: 6-MWT distance (meters): 420     FiO2: 1L N/C     SPO2: 87% placed on 2L N/C  Discharge: 6-MWT distance (meters):      FiO2:     SPO2:    Exercise Prescription:    Based on: {6-min walk test   Frequency:  3 days/week   Mode: Treadmill, NuStep, and Recumbent Cycle   Duration: 45 total aerobic minutes   Intensity: RPE 12-14       Target HR RPE/RPD       Max METS 7.2       SpO2 Range 88 to 100 %       O2 Flow Rate 1 to 2 L/min prn     Modality METS Load Duration  minutes   1 Pre-Exercise   2   2 Treadmill  4.3 2.8 mph  @3% 15   3 Nustep 4000  5.2 Load 8  140 vasquez 15   4 Recumbent Cycle  7.2 Load 9  70 rpm 15   5 Post-Exercise   5     Resistance Training: No   Home Exercise Prescription given: yes  Goal Status: In Progress  Exercise Goals: To increase MET level by 5-10% each week, To increase total exercise duration to 30-45 minutes  Exercise Interventions/Education:   3/19/24 Attended exercise rx and maint lecture. Handout given for home review/MS  3/22/24 Pt has local fitness center membership. Discussed adding weight training at least 2 days a week/mk  3/29/24 discussed resistance " training/MS  Other Core Components/Risk Factor Assessment:    Medication adherence:  Current Medications:    Current Outpatient Medications   Medication Sig Dispense Refill    acetaminophen (Tylenol) 500 mg tablet Take 1 tablet (500 mg) by mouth every 6 hours if needed for mild pain (1 - 3).      aspirin 81 mg EC tablet Take 1 tablet (81 mg) by mouth once daily.      atorvastatin (Lipitor) 20 mg tablet Take 1 tablet (20 mg) by mouth once daily. 90 tablet 1    cetirizine (ZyrTEC) 10 mg tablet Take 1 tablet (10 mg) by mouth once daily.      ipratropium (Atrovent) 21 mcg (0.03 %) nasal spray Administer 1 spray into each nostril 3 times a day. 30 mL 1    pirfenidone (Esbriet) 801 mg tablet tablet TAKE ONE (1) TABLET BY MOUTH THREE TIMES DAILY. 270 tablet 0     No current facility-administered medications for this visit.     Allergies:    Allergies   Allergen Reactions    Pollen Extracts Itching       Taking medications as prescribed: Yes      Uses pill box/organizer: No    Carries medication list: No     Blood Pressure Management:  Hx of Hypertension: No   Resting BP: 120/66    Smoking/Tobacco Assessment;    Social History     Tobacco Use   Smoking Status Never   Smokeless Tobacco Never       Goal Status: In Progress  Other Core Component Goals: Improve knowledge of lung disease, Manage bronchial hygiene  Other Core Component Interventions/Education:   2/27/24 gave pt ILD booklet/MK    # of hospital admissions due to lung problems: 0  # of ER visits due to lung problems: 0    Individual Patient Goals:  Walk 60 minutes prior discharge  General Comments:  Mr. Koenig continues to progress well with his pulmonary rehab. Interval training encouraged and helpful with higher MET levels. Continues to utilize 2L N/C with exercise to keep saturations greater than 88%. No respiratory complaints voiced. He will soon graduate from the program.      Rehab Staff Signature: Lynda Schulz

## 2024-04-30 ENCOUNTER — CLINICAL SUPPORT (OUTPATIENT)
Dept: CARDIAC REHAB | Facility: CLINIC | Age: 64
End: 2024-04-30
Payer: COMMERCIAL

## 2024-04-30 DIAGNOSIS — J84.112 IPF (IDIOPATHIC PULMONARY FIBROSIS) (MULTI): ICD-10-CM

## 2024-04-30 PROCEDURE — 94626 PHY/QHP OP PULM RHB W/MNTR: CPT | Performed by: INTERNAL MEDICINE

## 2024-05-03 ENCOUNTER — CLINICAL SUPPORT (OUTPATIENT)
Dept: CARDIAC REHAB | Facility: CLINIC | Age: 64
End: 2024-05-03
Payer: COMMERCIAL

## 2024-05-03 DIAGNOSIS — J84.112 IPF (IDIOPATHIC PULMONARY FIBROSIS) (MULTI): ICD-10-CM

## 2024-05-03 PROCEDURE — 94626 PHY/QHP OP PULM RHB W/MNTR: CPT | Performed by: INTERNAL MEDICINE

## 2024-05-06 ENCOUNTER — DOCUMENTATION (OUTPATIENT)
Dept: CARDIAC REHAB | Facility: CLINIC | Age: 64
End: 2024-05-06

## 2024-05-06 ENCOUNTER — CLINICAL SUPPORT (OUTPATIENT)
Dept: CARDIAC REHAB | Facility: CLINIC | Age: 64
End: 2024-05-06
Payer: COMMERCIAL

## 2024-05-06 DIAGNOSIS — J84.112 IPF (IDIOPATHIC PULMONARY FIBROSIS) (MULTI): ICD-10-CM

## 2024-05-06 PROCEDURE — 94626 PHY/QHP OP PULM RHB W/MNTR: CPT | Performed by: INTERNAL MEDICINE

## 2024-05-06 NOTE — PROGRESS NOTES
Pulmonary Rehab Discharge Report    Name: Micky Koenig  Medical Record Number: [id number]  YOB: 1960    Today’s Date: 5/6/2024  Primary Care Physician: Viktor Muñiz MD  Referring Physician: Hugo Ayon MD  Program Location: [place]  General  Primary Diagnosis: ILD  Date of Diagnosis: 4/22/22  Session #: 36    Oxygen Assessment  SP02 rest: 97 % R/A  SP02 exertion: 89-90% with 2L N/C with rehab exercise  Oxygen Use  Requires supplemental O2: Yes, exertional   Liters at Rest: NA   Liters with Exertion: 2L N/C  Using O2 as prescribed: Using with exercise and at night  Oxygen (Supplemental 02 use only)  Demonstrates appropriate knowledge of 02 use? Yes  Demonstrates knowledge of 02 safety? Yes  Home 02 system: concentrator  Portable 02:   Hypoxemia managed?: Yes  Home Pulse Oximeter?: Yes    MMRC Survey score:  Intake: 1  Discharge: 1    Goal Status: Met  Oxygen Goals: Decrease MMRC score, Learn and use diaphragmatic breathing as needed,  Learn and use pursed lip breathing as needed, Titrate supplemental O2 as needed to keep SpO2 at 88% or greater.  Oxygen Interventions:  2/5/24 SD book given to pt/MK  2/27/24 ILD book given to pt/MK  4/2/24 reviewed oxygen safety/MK      Psychosocial Assessment  Pt reported/currently experiencing:  concerned about diagnosis  Currently seeing a mental health provider: No  Social Support:  Yue, spouse  PHQ-9 Survey Score:   Intake: 0  Discharge: 1    CAT Survey Score:  Intake: 7  Discharge: 8  Learning assessment/barriers: Work and None  Preferred learning method: Visual   Stages of Change:  Action    Goal Status: In Progress  Psychosocial Goals: Decrease CAT score, Decrease PHQ-9 score.  Psychosocial Interventions/Education:   2/2/24 reviewed initial PHQ-9 score w/ pt/MS  3/26/24 No psychosocial concerns voiced to staff/mk  4/29/24 pt states he has a strong social system/MS  5/6/24 outcome remains in progress because post PHQ-9 and CAT scores increased by 1  "pt/MS    Nutrition Assessment:  Current Dietary Guidelines: no special diet  Barriers to dietary change: No   Diet Habit Survey: Picture Your Plate   Intake: 67  Discharge: 81  Diabetes Assessment  Diabetes:No  Weight Management  Height: 73 in  Weight: 236  BMI: 31.1   Goal Status: Met  Nutrition Goals: Increase PYP to a score greater than 60  Nutrition Interventions/Education:   2/2/24 Attended label reading lecture, handout given/SAI  2/9/24 Reviewed PYP  w/ pt.  Encouraged pt to read labels for sodium and added sugars/SAI  3/1/24 attended heart healthy diet education class, handout given/SAI  3/8/24 \"what about eating out\" handout given/SAI  3/18/24 reviewed and gave handouts on pulmonary nutrition w/ pt/SAI  4/23/24 reviewed lipid profile, handout given/    Exercise Assessment  Current Home Exercise: Yes   Mode: walking,weather permitting  Days/Week: 2  Min/Day: 30    6-minute Walk Assessment:   Intake: 6-MWT distance (meters): 420     FiO2: 1L N/C     SPO2: 87% placed on 2L N/C  Discharge: 6-MWT distance (meters): not completed     FiO2:     SPO2:    Exercise Prescription:    Based on: 6-min walk test   Frequency:  3 days/week   Mode: Treadmill, NuStep, and Recumbent Cycle   Duration: 45 total aerobic minutes   Intensity: RPE 12-14       Target HR RPE/RPD       Max METS 7.2       SpO2 Range 88 to 100 %       O2 Flow Rate 1 to 2 L/min prn     Modality METS Load Duration  minutes   1 Pre-Exercise   2   2 Treadmill  4.3 2.8 mph  @3% 15   3 Nustep 4000  5.2 Load 8  140 vasquez 15   4 Recumbent Cycle  7.2 Load 9  70 rpm 15   5 Post-Exercise   5     Resistance Training: Yes  Home Exercise Prescription given: yes  Goal Status: yes  Exercise Goals: To increase MET level by 5-10% each week, To increase total exercise duration to 30-45 minutes  Exercise Interventions/Education:   3/19/24 Attended exercise rx and maint lecture. Handout given for home review/MS  3/22/24 Pt has local fitness center membership. Discussed adding " weight training at least 2 days a week/mk  3/29/24 discussed resistance training/MS  Other Core Components/Risk Factor Assessment:    Medication adherence:  Current Medications:    Current Outpatient Medications   Medication Sig Dispense Refill    acetaminophen (Tylenol) 500 mg tablet Take 1 tablet (500 mg) by mouth every 6 hours if needed for mild pain (1 - 3).      aspirin 81 mg EC tablet Take 1 tablet (81 mg) by mouth once daily.      atorvastatin (Lipitor) 20 mg tablet Take 1 tablet (20 mg) by mouth once daily. 90 tablet 1    cetirizine (ZyrTEC) 10 mg tablet Take 1 tablet (10 mg) by mouth once daily.      ipratropium (Atrovent) 21 mcg (0.03 %) nasal spray Administer 1 spray into each nostril 3 times a day. 30 mL 1    pirfenidone (Esbriet) 801 mg tablet tablet TAKE ONE (1) TABLET BY MOUTH THREE TIMES DAILY. 270 tablet 0     No current facility-administered medications for this visit.     Allergies:    Allergies   Allergen Reactions    Pollen Extracts Itching       Taking medications as prescribed: Yes      Uses pill box/organizer: No    Carries medication list: No     Blood Pressure Management:  Hx of Hypertension: No   Resting BP: 120/66    Smoking/Tobacco Assessment;    Social History     Tobacco Use   Smoking Status Never   Smokeless Tobacco Never       Goal Status: Yes  Other Core Component Goals: Improve knowledge of lung disease, Manage bronchial hygiene  Other Core Component Interventions/Education:   2/27/24 gave pt ILD booklet/MK    # of hospital admissions due to lung problems: 0  # of ER visits due to lung problems: 0    Individual Patient Goals:  Walk 60 minutes prior discharge  General Comments:  Mr. Koenig has completed the pulmonary rehab program. He met the majority of the outcomes set for him as well as his personal goal.  He continued to utilize 2L N/C with exercise to keep saturations greater than 88%. No respiratory complaints voiced. He plans to continue to exercise at a local fitness  center.      Rehab Staff Signature: Lynda Schulz

## 2024-05-07 DIAGNOSIS — H91.90 HEARING LOSS, UNSPECIFIED HEARING LOSS TYPE, UNSPECIFIED LATERALITY: Primary | ICD-10-CM

## 2024-05-13 DIAGNOSIS — E78.5 DYSLIPIDEMIA: ICD-10-CM

## 2024-05-13 PROCEDURE — RXMED WILLOW AMBULATORY MEDICATION CHARGE

## 2024-05-13 RX ORDER — ATORVASTATIN CALCIUM 20 MG/1
20 TABLET, FILM COATED ORAL DAILY
Qty: 90 TABLET | Refills: 0 | Status: SHIPPED | OUTPATIENT
Start: 2024-05-13 | End: 2025-05-13

## 2024-05-14 PROCEDURE — RXMED WILLOW AMBULATORY MEDICATION CHARGE

## 2024-05-15 ENCOUNTER — SPECIALTY PHARMACY (OUTPATIENT)
Dept: PHARMACY | Facility: CLINIC | Age: 64
End: 2024-05-15

## 2024-05-16 ENCOUNTER — PHARMACY VISIT (OUTPATIENT)
Dept: PHARMACY | Facility: CLINIC | Age: 64
End: 2024-05-16
Payer: COMMERCIAL

## 2024-05-21 ENCOUNTER — TELEPHONE (OUTPATIENT)
Dept: ORTHOPEDIC SURGERY | Facility: CLINIC | Age: 64
End: 2024-05-21
Payer: COMMERCIAL

## 2024-05-21 DIAGNOSIS — Z01.818 PRE-OP EXAM: Primary | ICD-10-CM

## 2024-05-21 NOTE — TELEPHONE ENCOUNTER
Pt  stated he's going to dentist and didn't know if his pcp or if its dr green who would prescribe him an antibiotic before his dental appts

## 2024-05-21 NOTE — PROGRESS NOTES
"[facility]  New Visit Note        HISTORY OF PRESENT ILLNESS     The patient's referring provider is: Hugo Ayon MD    HISTORY OF PRESENT ILLNESS   Micky Koenig \"Nilton\" is a 64 y.o. male who presents to a [facility] for a sleep medicine evaluation with concerns of Consult and Sleep Apnea.     PAST SLEEP HISTORY    Patient has the following sleep-related diagnoses and sleep study results: mild-moderate KIM.    CURRENT HISTORY    On today's visit, the patient reports his sleep study was ordered d/t pulmonary fibrosis diagnosis  He was placed on o2 due to 6 min walk test results  Concern about oxygen saturation at night    +snoring, -apnea, -gasping/choking  Wakes up to use bathroom or d/t soreness  -excessive daytime sleepiness    Occasionally wakes and can't go back to sleep but very rare    NECK 15.05\"      Sleep schedule  on weekdays / work days:  Usual Bedtime  10 p  Falls asleep around  1005 pm  Wake time  5 a    Sleep schedule  on weekends/non work days :  Usual Bedtime  11 p  Falls asleep around 1105 pm  Wake time  8 a    Naps:   no    Average sleep duration 7 hours/day    Preferred sleeping position: side    Sleep-related ROS:    Sleep Initiation: no problems going to sleep    Sleep Maintenance: wakes to use bathroom    Recreational drug use  Smoking: never  Alcohol consumption: 2/week  Caffeine consumption:  2/day  Marijuana: never    ESS: 3      PHYSICAL EXAM     VITAL SIGNS: /80   Pulse 51   Ht 1.88 m (6' 2\")   Wt 101 kg (222 lb)   SpO2 97%   BMI 28.50 kg/m²      CURRENT WEIGHT: [unfilled]  BMI: [unfilled]  PREVIOUS WEIGHTS:  Wt Readings from Last 3 Encounters:   05/22/24 101 kg (222 lb)   04/06/24 104 kg (229 lb)   03/14/24 105 kg (231 lb)       PHYSICAL EXAM: GENERAL: alert oriented x 3 pleasant and cooperative no acute distress  MODIFIED MALLAMPATI SCORE: 2+  LATERAL PHARYNGEAL WALL: 2+  NECK EXAM: normal supple no " "adenopathy    RESULTS/DATA     No results found for: \"IRON\", \"TRANSFERRIN\", \"IRONSAT\", \"TIBC\", \"FERRITIN\"    ASSESSMENT/PLAN     Mr. Koenig is a 64 y.o. male and was referred to the [facility] for the following issues:    OBSTRUCTIVE SLEEP APNEA / IPF  -Reviewed test results with patient  -Try positional therapy first  -Provided list of dentists who perform OAT  -Goal of treatment includes less daytime sleepiness, less waking at night, stopping snoring, less nocturnal hypoxia    Followup 2 months       "

## 2024-05-22 ENCOUNTER — OFFICE VISIT (OUTPATIENT)
Dept: SLEEP MEDICINE | Facility: CLINIC | Age: 64
End: 2024-05-22
Payer: COMMERCIAL

## 2024-05-22 VITALS
OXYGEN SATURATION: 97 % | WEIGHT: 222 LBS | BODY MASS INDEX: 28.49 KG/M2 | DIASTOLIC BLOOD PRESSURE: 80 MMHG | HEART RATE: 51 BPM | HEIGHT: 74 IN | SYSTOLIC BLOOD PRESSURE: 130 MMHG

## 2024-05-22 DIAGNOSIS — G47.33 OSA (OBSTRUCTIVE SLEEP APNEA): Primary | ICD-10-CM

## 2024-05-22 DIAGNOSIS — J84.112 IPF (IDIOPATHIC PULMONARY FIBROSIS) (MULTI): ICD-10-CM

## 2024-05-22 PROCEDURE — 1126F AMNT PAIN NOTED NONE PRSNT: CPT | Performed by: PHYSICIAN ASSISTANT

## 2024-05-22 PROCEDURE — 99203 OFFICE O/P NEW LOW 30 MIN: CPT | Performed by: PHYSICIAN ASSISTANT

## 2024-05-22 PROCEDURE — RXMED WILLOW AMBULATORY MEDICATION CHARGE

## 2024-05-22 PROCEDURE — 1159F MED LIST DOCD IN RCRD: CPT | Performed by: PHYSICIAN ASSISTANT

## 2024-05-22 PROCEDURE — 99213 OFFICE O/P EST LOW 20 MIN: CPT | Performed by: PHYSICIAN ASSISTANT

## 2024-05-22 PROCEDURE — 1160F RVW MEDS BY RX/DR IN RCRD: CPT | Performed by: PHYSICIAN ASSISTANT

## 2024-05-22 RX ORDER — AMOXICILLIN 500 MG/1
500 CAPSULE ORAL 4 TIMES DAILY
Qty: 40 CAPSULE | Refills: 0 | Status: SHIPPED | OUTPATIENT
Start: 2024-05-22 | End: 2024-06-02

## 2024-05-22 ASSESSMENT — SLEEP AND FATIGUE QUESTIONNAIRES
HOW LIKELY ARE YOU TO NOD OFF OR FALL ASLEEP WHILE SITTING AND READING: WOULD NEVER DOZE
ESS-CHAD TOTAL SCORE: 3
HOW LIKELY ARE YOU TO NOD OFF OR FALL ASLEEP WHILE WATCHING TV: SLIGHT CHANCE OF DOZING
HOW LIKELY ARE YOU TO NOD OFF OR FALL ASLEEP IN A CAR, WHILE STOPPED FOR A FEW MINUTES IN TRAFFIC: WOULD NEVER DOZE
HOW LIKELY ARE YOU TO NOD OFF OR FALL ASLEEP WHEN YOU ARE A PASSENGER IN A CAR FOR AN HOUR WITHOUT A BREAK: WOULD NEVER DOZE
SITING INACTIVE IN A PUBLIC PLACE LIKE A CLASS ROOM OR A MOVIE THEATER: SLIGHT CHANCE OF DOZING
HOW LIKELY ARE YOU TO NOD OFF OR FALL ASLEEP WHILE SITTING QUIETLY AFTER LUNCH WITHOUT ALCOHOL: WOULD NEVER DOZE
HOW LIKELY ARE YOU TO NOD OFF OR FALL ASLEEP WHILE LYING DOWN TO REST IN THE AFTERNOON WHEN CIRCUMSTANCES PERMIT: SLIGHT CHANCE OF DOZING
HOW LIKELY ARE YOU TO NOD OFF OR FALL ASLEEP WHILE SITTING AND TALKING TO SOMEONE: WOULD NEVER DOZE

## 2024-05-22 ASSESSMENT — LIFESTYLE VARIABLES
HOW OFTEN DO YOU HAVE SIX OR MORE DRINKS ON ONE OCCASION: NEVER
SKIP TO QUESTIONS 9-10: 1
HOW MANY STANDARD DRINKS CONTAINING ALCOHOL DO YOU HAVE ON A TYPICAL DAY: 1 OR 2
HOW OFTEN DO YOU HAVE A DRINK CONTAINING ALCOHOL: 2-4 TIMES A MONTH
AUDIT-C TOTAL SCORE: 2

## 2024-05-22 ASSESSMENT — PATIENT HEALTH QUESTIONNAIRE - PHQ9
SUM OF ALL RESPONSES TO PHQ9 QUESTIONS 1 & 2: 0
2. FEELING DOWN, DEPRESSED OR HOPELESS: NOT AT ALL
1. LITTLE INTEREST OR PLEASURE IN DOING THINGS: NOT AT ALL

## 2024-05-22 ASSESSMENT — PAIN SCALES - GENERAL: PAINLEVEL: 0-NO PAIN

## 2024-05-22 NOTE — PATIENT INSTRUCTIONS
Good seeing you today,    I recommend positional therapy for KIM. This can treat sleep apnea without the need for PAP or other therapies. Sometimes people find it helpful to sew a tennis ball in a shirt to prevent rolling onto your back.    Here are some products that can assist in keeping you on your side.    https://www.[employer]/    https://www.[employer]/    [email or web address]://[email or web address]/      Oral Appliance Therapy (also known as Mandibular Advancement Device): After talking about several sleep apnea treatment options, we have decided that an oral appliance is a reasonable treatment option. Oral appliance therapy is a dental device that can be fabricated, customized, and adjusted by a dental sleep medicine specialist.  You need to contact a dentist who is comfortable with making oral appliances for sleep apnea. You should consider a dentist who also specialized in sleep medicine. Below are the names of individuals that we commonly refer patients to or are known as dental sleep medicine specialists.    After your oral appliance is made and adjusted (~3-6 months), we would like to make sure that the oral appliance is treating your sleep apnea effectively. Please return to my clinic at that time for follow up and testing.     Dr. Herb Butler  /[facility]: [phone]    Dr. Nikolay Fontana  [facility]   [phone]    Dr. Álvaro Foster  [place], [place]: [phone]    Dr. Micky Valdez, [place]: [phone]    Dr. Anshu Hyde, [place]: [phone]    Dr. Neo Cordon, [place]: [phone]    Dr. Tato Olivarez, [place]: [phone]    Dr. Roger Raymond  [place], [place]: [phone]    Dr. Robert Sanderson  [place], [place]: [phone]    [facility]:  Dr. Max Crespo    You can also check the American Academy of Dental Sleep Medicine for additional recommendation of dentists that make oral appliances at:  https://mms.aad.org/members/directory/search_bootstrap.php?org_id=ADSM.    Oral appliance therapy is typically covered by your medical insurance as sleep apnea is a medical diagnosis and not a dental diagnosis. You can confirm coverage by calling your medical insurance and providing them with the following medical codes:    Diagnosis code: Obstructive Sleep Apnea G47.33  Procedure code: I34480     Herbert Pizarro PA-C    IMPORTANT PHONE NUMBERS     [phone] ([phone])  [employer] (DME): [phone]  For clinical questions and refilling prescriptions: [phone]  Becki Covarrubias (For Dulce Maria/Moira): P: [phone]

## 2024-05-23 ENCOUNTER — PHARMACY VISIT (OUTPATIENT)
Dept: PHARMACY | Facility: CLINIC | Age: 64
End: 2024-05-23
Payer: COMMERCIAL

## 2024-06-07 ENCOUNTER — SPECIALTY PHARMACY (OUTPATIENT)
Dept: PHARMACY | Facility: CLINIC | Age: 64
End: 2024-06-07

## 2024-06-11 ENCOUNTER — PHARMACY VISIT (OUTPATIENT)
Dept: PHARMACY | Facility: CLINIC | Age: 64
End: 2024-06-11
Payer: COMMERCIAL

## 2024-06-11 DIAGNOSIS — D86.9 SARCOIDOSIS: ICD-10-CM

## 2024-06-11 DIAGNOSIS — J84.112 IPF (IDIOPATHIC PULMONARY FIBROSIS) (MULTI): Primary | ICD-10-CM

## 2024-06-11 PROCEDURE — RXMED WILLOW AMBULATORY MEDICATION CHARGE

## 2024-06-11 RX ORDER — PIRFENIDONE 801 MG/1
TABLET, FILM COATED ORAL
Qty: 270 TABLET | Refills: 0 | Status: SHIPPED | OUTPATIENT
Start: 2024-06-11 | End: 2025-06-11

## 2024-06-12 PROCEDURE — RXMED WILLOW AMBULATORY MEDICATION CHARGE

## 2024-06-13 DIAGNOSIS — J84.112 IPF (IDIOPATHIC PULMONARY FIBROSIS) (MULTI): ICD-10-CM

## 2024-06-13 RX ORDER — PIRFENIDONE 801 MG/1
TABLET, FILM COATED ORAL
Qty: 270 TABLET | Refills: 0 | Status: CANCELLED | OUTPATIENT
Start: 2024-06-13 | End: 2025-06-13

## 2024-06-14 ENCOUNTER — PHARMACY VISIT (OUTPATIENT)
Dept: PHARMACY | Facility: CLINIC | Age: 64
End: 2024-06-14
Payer: COMMERCIAL

## 2024-06-14 ENCOUNTER — HOSPITAL ENCOUNTER (OUTPATIENT)
Dept: RADIOLOGY | Facility: HOSPITAL | Age: 64
Discharge: HOME | End: 2024-06-14
Payer: COMMERCIAL

## 2024-06-14 DIAGNOSIS — J84.112 IPF (IDIOPATHIC PULMONARY FIBROSIS) (MULTI): ICD-10-CM

## 2024-06-14 PROCEDURE — 71250 CT THORAX DX C-: CPT

## 2024-06-19 ENCOUNTER — HOSPITAL ENCOUNTER (OUTPATIENT)
Dept: RESPIRATORY THERAPY | Facility: CLINIC | Age: 64
Discharge: HOME | End: 2024-06-19
Payer: COMMERCIAL

## 2024-06-19 DIAGNOSIS — J84.112 IPF (IDIOPATHIC PULMONARY FIBROSIS) (MULTI): ICD-10-CM

## 2024-06-19 PROCEDURE — 98960 EDU&TRN PT SELF-MGMT NQHP 1: CPT

## 2024-06-19 PROCEDURE — 94618 PULMONARY STRESS TESTING: CPT

## 2024-06-19 PROCEDURE — 94760 N-INVAS EAR/PLS OXIMETRY 1: CPT

## 2024-06-19 PROCEDURE — 94729 DIFFUSING CAPACITY: CPT

## 2024-06-19 PROCEDURE — 94010 BREATHING CAPACITY TEST: CPT

## 2024-06-20 ENCOUNTER — APPOINTMENT (OUTPATIENT)
Dept: AUDIOLOGY | Facility: CLINIC | Age: 64
End: 2024-06-20
Payer: COMMERCIAL

## 2024-06-20 ENCOUNTER — APPOINTMENT (OUTPATIENT)
Dept: RESPIRATORY THERAPY | Facility: CLINIC | Age: 64
End: 2024-06-20
Payer: COMMERCIAL

## 2024-07-08 ENCOUNTER — SPECIALTY PHARMACY (OUTPATIENT)
Dept: PHARMACY | Facility: CLINIC | Age: 64
End: 2024-07-08

## 2024-07-08 PROCEDURE — RXMED WILLOW AMBULATORY MEDICATION CHARGE

## 2024-07-11 ENCOUNTER — APPOINTMENT (OUTPATIENT)
Dept: AUDIOLOGY | Facility: CLINIC | Age: 64
End: 2024-07-11
Payer: COMMERCIAL

## 2024-07-11 ENCOUNTER — CLINICAL SUPPORT (OUTPATIENT)
Dept: AUDIOLOGY | Facility: CLINIC | Age: 64
End: 2024-07-11

## 2024-07-11 DIAGNOSIS — H90.3 SENSORINEURAL HEARING LOSS (SNHL) OF BOTH EARS: Primary | ICD-10-CM

## 2024-07-11 DIAGNOSIS — H93.13 TINNITUS OF BOTH EARS: ICD-10-CM

## 2024-07-11 DIAGNOSIS — H91.90 HEARING LOSS, UNSPECIFIED HEARING LOSS TYPE, UNSPECIFIED LATERALITY: ICD-10-CM

## 2024-07-11 PROCEDURE — HRANC PR HEARING AID NO CHARGE: Performed by: AUDIOLOGIST

## 2024-07-11 PROCEDURE — 92550 TYMPANOMETRY & REFLEX THRESH: CPT | Performed by: AUDIOLOGIST

## 2024-07-11 PROCEDURE — 92557 COMPREHENSIVE HEARING TEST: CPT | Performed by: AUDIOLOGIST

## 2024-07-11 ASSESSMENT — PAIN SCALES - GENERAL: PAINLEVEL_OUTOF10: 0 - NO PAIN

## 2024-07-11 ASSESSMENT — ENCOUNTER SYMPTOMS: OCCASIONAL FEELINGS OF UNSTEADINESS: 0

## 2024-07-11 ASSESSMENT — PAIN - FUNCTIONAL ASSESSMENT: PAIN_FUNCTIONAL_ASSESSMENT: 0-10

## 2024-07-11 NOTE — PROGRESS NOTES
AUDIOLOGY  HEARING AID EVALUATION    Name:  Micky Koenig  :  1960  Age:  64 y.o.  Date of Service:  2024    Reason for visit: Mr. Koenig is seen in the clinic today for a hearing aid evaluation.    HISTORY  Patient reports a gradual decline in hearing over the years and constant tinnitus in both ears. Audiogram dated 2024 revealed normal hearing sensitivity through 750 Hz with a mild to moderately-severe sensorineural hearing loss in the right ear, and normal hearing sensitivity through 750 Hz with a mild to severe sensorineural hearing loss in the left ear. Word recognition ability was excellent bilaterally.    Patient has never worn hearing aids. He wears glasses, but there are no apparent vision or dexterity issues. Patient is interested in connectivity. INSURANCE () IS CHANGING AT THE END OF THIS MONTH.     SCREENINGS  Steadi Fall Risk  One or more falls in the last year? No  Feels unsteady when walking? No  Worried about Falling? Yes     Domestic Violence Screening  Are you currently or have you recently been threatened or abused physically, emotionally, or sexually by anyone? No  Do you feel UNSAFE going back to the place you are living? No     Pain Assessment  Pain Assessment: 0-10  0-10 (Numeric) Pain Score: 0 - No pain    HEARING AID EVALUATION  Reviewed results of audiologic evaluation with the patient in detail.    Client Oriented Scale of Improvement (COSI) goals:  1. Hear easier    Communication difficulties, amplification options and potential benefits/limitations of hearing aids were discussed. Specifically discussed hearing aid styles, technology levels, and monaural versus binaural hearing aids. Also briefly discussed over the counter devices and the option to do nothing. Explained cell phone connectivity options, traditional battery versus rechargeable, water resistant versus water proof hearing aids, trial period, repair warranty, and loss/damage warranty. Explained  that there is no fee for hearing aid office visits within one year of hearing aid fitting; however, after one year there will be a fee. Discussed ReSound promotion.    Encouraged patient to contact insurance to determine if there is an applicable benefit and where it can be used. Specifically discussed third party administrators. Specifically discussed pre-authorization. INSURANCE IS CHANGING AT THE END OF THIS MONTH. Discussed self-pay, discussed possible insurance benefit through current plan, discussed possible benefit with new insurance plan. Hearing aids must be fit before coverage ends if using current insurance plan; patient is aware that there is a chance of us missing the deadline.      IMPRESSIONS  Patient will consider and contact me when he is ready to proceed with ordering devices. (Insurance is changing at the end of this month.)     Patient expressed interest in a trial of rechargeable  in the ear (LILIANA) hearing aids bilaterally. Specifically, ReSound Nexia 9 [id number]-[id number] miniRIE rechargeable  in the ear (LILIANA) hearing aids in sparkling silver with 1LP receivers, medium closed domes, and no retention lines. New user. Connect to cell phone. CONCORD.    RECOMMENDATIONS  - Contact insurance to determine if there is an applicable benefit and where it can be used.  - Patient will consider and contact me should he wish to proceed with ordering hearing aids.  - Annual audiologic evaluation, sooner if an acute change is noted.  - Follow-up with medical care team as planned.    PATIENT EDUCATION  Discussed results, impressions and recommendations with the patient. Questions were addressed and the patient was encouraged to contact our office should concerns arise.    Time for this encounter: 1839-5110    Catrina Roblero, CCC-A  Licensed Audiologist

## 2024-07-11 NOTE — PROGRESS NOTES
AUDIOLOGY    Name:  Micky Koenig  :  1960   Age:  64 y.o.  Date:  2024    Patient called and spoke with audiology assistant. He wishes to proceed with hearing aids under his current insurance plan, and he does have a significant benefit. Audiology assistant scheduled hearing aid fitting appointment before the end of this month. Requested pre-authorization from  and requested purchase order number. Will place order for devices.     2024: Purchase order number received. Devices ordered.  2024: Approval received from insurance per audiology assistant.    Catrina Roblero, CCC-A  Licensed Audiologist

## 2024-07-11 NOTE — PROGRESS NOTES
AUDIOLOGY  AUDIOMETRIC EVALUATION    Name:  Micky Koenig  :  1960  Age:  64 y.o.  Date of Evaluation:  2024    Reason for visit: Mr. Koenig is seen in the clinic today at the request of his primary care physician Viktor Muñiz MD for an audiologic evaluation. Patient complains of hearing loss.    HISTORY  Patient reports a gradual decline in hearing over the year with his perception being that the right ear is now a little better than the left. He also reports constant tinnitus in both ears, and a history of noise exposure (has used hearing protection for many years). Mother with presbycusis. Patient denies aural fullness/pressure, otalgia, otorrhea, dizziness/balance disturbance, and a history of otologic surgery. Case history was obtained from the patient.    Most recent audiologic evaluation completed on 2020 revealed bilateral sensorineural hearing loss with excellent word recognition ability.     SCREENINGS  Steadi Fall Risk  One or more falls in the last year? No  Feels unsteady when walking? No  Worried about Falling? Yes    Domestic Violence Screening  Are you currently or have you recently been threatened or abused physically, emotionally, or sexually by anyone? No  Do you feel UNSAFE going back to the place you are living? No    Pain Assessment  Pain Assessment: 0-10  0-10 (Numeric) Pain Score: 0 - No pain    EVALUATION  See scanned audiogram: “Media” > “Audiology Report” > Document ID [id number].      RESULTS  Otoscopic Evaluation  Right Ear: clear ear canal with visualization of the tympanic membrane   Left Ear: clear ear canal with visualization of the tympanic membrane     Immittance Measures  Tympanometry:  Right Ear: Type A, normal tympanic membrane mobility with normal middle ear pressure  Left Ear: Type As, reduced tympanic membrane mobility with normal middle ear pressure     Acoustic Reflexes:  Ipsilateral Right Ear: present and normal from 500 Hz to 1000 Hz, absent from  2000 Hz to 4000 Hz   Ipsilateral Left Ear: present and normal from 500 Hz to 1000 Hz, absent from 2000 Hz to 4000 Hz   Contralateral Right Ear: did not evaluate  Contralateral Left Ear: did not evaluate    Distortion Product Otoacoustic Emissions (DPOAEs)  Right Ear: absent from 1000 Hz to 8000 Hz  Left Ear: absent from 1000 Hz to 8000 Hz    Audiometry  Test Technique and Reliability:   Standard audiometry via supra-aural headphones. Pulsed tone stimulus. Reliability is good.    Pure tone air and bone conduction audiometry:  Right Ear: normal hearing sensitivity through 750 Hz with a mild to moderately-severe sensorineural hearing loss   Left Ear: normal hearing sensitivity through 750 Hz with a mild to severe sensorineural hearing loss     Speech Audiometry:  Speech Reception Threshold (SRT) Right Ear: 30 dB HL   Speech Reception Threshold (SRT) Left Ear: 30 dB HL   Word Recognition Score (WRS) Right Ear: Excellent, 100% at 70 dB HL   Word Recognition Score (WRS) Left Ear: Excellent, 100% at 70 dB HL     IMPRESSIONS  Audiometric evaluation revealed high frequency sensorineural hearing loss bilaterally. Tympanometry indicates normal tympanic membrane mobility with normal middle ear pressure in the right ear, and reduced tympanic membrane mobility in the left ear. Results are essentially stable compared with 01/23/2020 evaluation. The presence of acoustic reflexes within normal intensity limits is consistent with normal middle ear and brainstem function, and suggests that auditory sensitivity is not significantly impaired. The absence of acoustic reflexes is consistent with a middle ear disorder, hearing loss in the stimulated ear, and/or interruption of neural innervation of the stapedius muscle. Absent Distortion Product Otoacoustic Emissions (DPOAEs) are consistent with abnormal cochlear outer hair cell function and some degree of hearing loss at those frequencies.    RECOMMENDATIONS  - Annual audiologic  evaluation, sooner if an acute change is noted.  - Consider hearing aids. Contact insurance to determine if there is an applicable benefit and where it can be used. Hearing aid evaluation appointment.  - Follow-up with medical care team as planned.    PATIENT EDUCATION  Discussed results, impressions and recommendations with the patient. Questions were addressed and the patient was encouraged to contact our office should concerns arise.    Time for this encounter: 1497-0255    Catrina Roblero, CCC-A  Licensed Audiologist

## 2024-07-12 ENCOUNTER — PHARMACY VISIT (OUTPATIENT)
Dept: PHARMACY | Facility: CLINIC | Age: 64
End: 2024-07-12
Payer: COMMERCIAL

## 2024-07-16 ENCOUNTER — SPECIALTY PHARMACY (OUTPATIENT)
Dept: PHARMACY | Facility: CLINIC | Age: 64
End: 2024-07-16

## 2024-07-16 NOTE — PROGRESS NOTES
"[facility] Clinical Note    Micky Koenig \"Nilton\" is a 64 y.o. male, who is on the specialty pharmacy service for management of:  Pulmonary Fibrosis Non-Core.    Micky Koenig \"Nilton\" is taking: Esbriet.    Medication Receipt Date: n/a  Medication Start Date (planned or actual): n/a    Micky was contacted on 7/16/2024 at 1:53 PM for a virtual pharmacy visit with encounter number [id number] from:   [facility]  [address] [place] [id number]  Dept: [phone]  Dept Fax: [phone]    Micky was offered a Telemedicine Video visit or Telephone visit.  Micky consented to a Esbriet visit, which was performed.    The most recent encounter visit with the referring prescriber Hugo Ayon on 3/14/24 (Date) was reviewed.  Pharmacy will continue to collaborate in the care of this patient with the referring prescriber Hugo Ayon.    General Assessment      Impression/Plan  IMPRESSION/PLAN:  Is patient high risk (potential patients:  pregnancy, geriatric, pediatric)?  Geriatric   Is laboratory follow-up needed? no  Is a clinical intervention needed? no  Next reassessment date? 1/16/25  Additional comments:     Refer to the encounter summary report for documentation details about patient counseling and education.      Medication Adherence    The importance of adherence was discussed with the patient and they were advised to take the medication as prescribed by their provider. Patient was encouraged to call their physician's office if they have a question regarding a missed dose.     QOL/Patient Satisfaction  Rate your quality of life on scale of 1-10: 10 - Completely satisfied  Rate your satisfaction with  Specialty Pharmacy on scale of 1-10: 9      Patient was advised to contact the pharmacy if there are any changes to their medication list, including prescriptions, OTC medications, herbal products, or supplements. Patient was advised of " [facility]'s dispensing process, refill timeline, contact information ([phone]), and patient management follow up. Patient confirmed understanding of education conducted during assessment. All patient questions and concerns were addressed to the best of my ability. Patient was encouraged to contact the specialty pharmacy with any questions or concerns.    Confirmed follow-up outreaches are properly scheduled and reviewed goals of therapy with the patient.        Huang Barnes, PharmD

## 2024-07-17 NOTE — PROGRESS NOTES
Mr. HADLEY LEWIS is a 63 year White M here for FUV IPF     Interval 7/18/2024  Doing good with the pirfenidone 801 TID no major side effects.  He was offered early FPC.  He is much better now with his diet and exercise.  No cough no shortness of breath.  He was some production when he golfs and works in the yard.  He finished pulmonary rehab    PE:  /89   Pulse 64   Temp 36.1 °C (96.9 °F)   Wt 101 kg (223 lb 4.8 oz)   SpO2 96%   BMI 28.67 kg/m²   Crackles at the bases     Interim 3/14/2024  He is doing well overall.  He is enrolled in pulmonary rehab.  He is wearing his oxygen in the rehab and at night but not otherwise.  No weight loss or loss of appetite.  The pirfenidone is well-tolerated.  PE:  Wt 105 kg (231 lb)   BMI 30.82 kg/m²   Lungs: Bibasilar crackles  He completed his pulmonary function test, echocardiography, chest CT and sleep study; results are detailed below     Interim 09 27 2023   He continues to do very well he underwent right hip surgery and is recovering nicely.  Lungs: bilateral crackles at bases. POX 95%            Interim 06 21 2023   He is doing overall really well. He denies any cough sputum production wheezing chest tightness. He is about 6 weeks post left hip replacement and feels great. He is scheduled to have his right hip in 2 weeks. He lost about 20 pounds in the last few months intentionally.     Lungs: Bibasilar Velcro crackles. Pulse manually checked 62. He denies any dizziness lightheadedness chest pain.  LFT's April 2023 wnl     interim 03 08 2023      No new symptoms. cough seems better after starting Flonase. January. He is tolerating it very well no significant side effects specifically no GI upset and no significant fatigue     LFTS wnl     History of Present Illness   Mr. Lewis has a persistent non-productive cough since he had an upper respiratory tract infection in December 2019. Prior to that time, he had intermittent cough typically related to sinus  symptoms that occurred at season change and responded well to nasal sprays. Mr. Koenig recently had an annual physical and CXR was done that showed abnormalities that was followed up with CT chest. Mr. Koenig walks daily (typically ~3 miles) without limitation. He does report mild shortness of breath if he climbs 2 flights of stairs. He reports seasonal allergies triggered by grass / pollen (worse in spring). cough is manly dry. Very rare nocturnal awakening. The cough is throughout the day. There is no associated chest tightness or weakness. A few years ago he used to walk 10-20,000 steps but then he cut down to about 7000 steps per day but not necessarily due to shortness of breath but more so due to joints   overuse. No arthritic symptoms, no Raynaud's no problems swallowing. About 15 to 20 pounds in the last 2 years        Mr. Koenig thinks he likely had COVID in December 2021 - his wife tested positive and was symptomatic. He initially tested negative, but developed similar symptoms several days after the test. He tested again later, but was also negative at that time.        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 diffusely distributed  repeat HRCT August 2022 Mild bronchiectasis and bronchiolectasis distributed along both lungs. There is diffuse subpleural reticulation with no definite craniocaudal gradient. Expiratory imaging demonstrates no evidence of significant air-trapping.  01/25/2024 Findings suggesting UIP/IPF somewhat progressed from previous exam. New 7 mm nodule in the left upper lobe.         PFTs:June 2022  Ratio normal FVC in the 50% , DLCO 77  May 2023 FVC 2.76 (52%), DLCO 82 (stable)  09 2023 FVC 2.4, FEV1 :1.9 , DLCO 17 (63%)  03 2024 FVC 2.47 FEV1 :2.1 , DLCO 14 (57%)  06 2024 FVC 2.2 FEV1 1.8, DLCO 14.9 (55%)                     6MWT 09 2023 completed but results not available   6MWT 06 2024 402m, dropped to 89% on RA      Echo 01/2024    1. Left ventricular systolic function is normal with a 60-65% estimated ejection fraction.   2. Spectral Doppler shows an impaired relaxation pattern of left ventricular diastolic filling.   3. There is mild mitral, tricuspid and pulmonic regurgitation.   4. The estimated pulmonary artery pressure is mildly elevated with the RVSP at 44.3 mmHg.       Impression and Recommendations     1.IPF Stable clinically but pulmonary function and CT slightly worse on CT 06/2024 vs Chest CT jan 2024 and slight progression compared to 2022   -Continue pirfenidone well tolerated  -Pulmonary rehab finished in May 2024  -Continue oxygen with exertion and at night  -We again discussed referral to a lung transplant evaluation but he would like to wait.  -Will repeat testing in 3 months and decide about switching pirfenidone to Ofev and referral to lung transplant  -Encouraged him to continue to be active    2.Echo with slightly elevated RVSP to 44 and slight dilation of the RV   -repeat next visit    3.New 7 mm nodule in the CHALINO  Follow up Ct in 6 months--stable    4.Moderate KIM   Refer to sleep medicine

## 2024-07-18 ENCOUNTER — OFFICE VISIT (OUTPATIENT)
Dept: PULMONOLOGY | Facility: HOSPITAL | Age: 64
End: 2024-07-18
Payer: COMMERCIAL

## 2024-07-18 VITALS
BODY MASS INDEX: 28.67 KG/M2 | OXYGEN SATURATION: 96 % | HEART RATE: 64 BPM | SYSTOLIC BLOOD PRESSURE: 145 MMHG | DIASTOLIC BLOOD PRESSURE: 89 MMHG | WEIGHT: 223.3 LBS | TEMPERATURE: 96.9 F

## 2024-07-18 DIAGNOSIS — J84.112 IPF (IDIOPATHIC PULMONARY FIBROSIS) (MULTI): Primary | ICD-10-CM

## 2024-07-18 PROCEDURE — 99214 OFFICE O/P EST MOD 30 MIN: CPT | Performed by: INTERNAL MEDICINE

## 2024-07-18 PROCEDURE — 1126F AMNT PAIN NOTED NONE PRSNT: CPT | Performed by: INTERNAL MEDICINE

## 2024-07-18 PROCEDURE — 1036F TOBACCO NON-USER: CPT | Performed by: INTERNAL MEDICINE

## 2024-07-18 SDOH — ECONOMIC STABILITY: FOOD INSECURITY: WITHIN THE PAST 12 MONTHS, THE FOOD YOU BOUGHT JUST DIDN'T LAST AND YOU DIDN'T HAVE MONEY TO GET MORE.: NEVER TRUE

## 2024-07-18 SDOH — ECONOMIC STABILITY: FOOD INSECURITY: WITHIN THE PAST 12 MONTHS, YOU WORRIED THAT YOUR FOOD WOULD RUN OUT BEFORE YOU GOT MONEY TO BUY MORE.: NEVER TRUE

## 2024-07-18 ASSESSMENT — LIFESTYLE VARIABLES
SKIP TO QUESTIONS 9-10: 1
HOW OFTEN DO YOU HAVE A DRINK CONTAINING ALCOHOL: MONTHLY OR LESS
AUDIT-C TOTAL SCORE: 1
HOW OFTEN DO YOU HAVE SIX OR MORE DRINKS ON ONE OCCASION: NEVER
HOW MANY STANDARD DRINKS CONTAINING ALCOHOL DO YOU HAVE ON A TYPICAL DAY: 1 OR 2

## 2024-07-18 ASSESSMENT — PATIENT HEALTH QUESTIONNAIRE - PHQ9
2. FEELING DOWN, DEPRESSED OR HOPELESS: NOT AT ALL
1. LITTLE INTEREST OR PLEASURE IN DOING THINGS: NOT AT ALL
SUM OF ALL RESPONSES TO PHQ9 QUESTIONS 1 & 2: 0

## 2024-07-18 ASSESSMENT — PAIN SCALES - GENERAL: PAINLEVEL: 0-NO PAIN

## 2024-07-18 NOTE — PATIENT INSTRUCTIONS
Dear Micky Koenig It was nice seeing you today. We discussed the following:     You are doing well. Your breathing test numbers and the chest CT are slightly worse vs jan 2024   Continue the pirfenidone  Continue to exercise like you are doing   Wear your oxygen with activity and at night  Please have your liver function test checked now and every 3 months   You have moderate sleep apnea and I made referral to the sleep specialist  Your echo showed mild pressure inside the right side of the heart and we will monitor this by repeating an with next visit   RTC in 3 months with testing and echo and decide about transplant referral and switching pirfenidone    For scheduling purposes:    Call [phone] to schedule a breathing or a walking test     Call [phone] to schedule  EKG's, Echocardiograms and Cardiopulmonary Stress Tests.    Call [phone] to schedule Radiology tests such as Nuclear Medicine Stress Tests, CT Scans, and MRI's.    Should you have any questions Please Call my assistant Ava Recio at [phone] or our pulmonary nurse Ashley Birmingham [phone].

## 2024-07-22 NOTE — PROGRESS NOTES
"[facility]  Followup Visit Note    HISTORY OF PRESENT ILLNESS   Micky Koenig \"Nilton\" is a 64 y.o. male who presents to a [facility] for followup.       Current History    On today's visit, the patient reports he is doing well.    He wasn't able to do positional therapy due to pain/soreness in his shoulders.  He decided on trying an oral appliance.    He went through dentist Shubham Keating for his OAT.    He has been using the OAT and doing well. With it he wakes up only 2 times per night and he is not snoring per his wife.    Sleep Scales:  ESS: 4     REVIEW OF SYSTEMS    All other systems negative      PHYSICAL EXAM     VITAL SIGNS: /82   Pulse 53   Ht 1.88 m (6' 2\")   Wt 99.8 kg (220 lb)   SpO2 97%   BMI 28.25 kg/m²      PREVIOUS WEIGHTS:  Wt Readings from Last 3 Encounters:   07/24/24 99.8 kg (220 lb)   07/18/24 101 kg (223 lb 4.8 oz)   05/22/24 101 kg (222 lb)       Constitutional: Alert and oriented, cooperative, no obvious distress   HEENT: Non icteric or anemic, EOM WNL bilaterally   Neck: Supple, no JVD, no goiter, no adenopathy, no rigidity  Extremities: No clubbing, no LL edema   Neuromuscular: Cranial nerves grossly intact, no focal deficits     RESULTS/DATA     No results found for: \"IRON\", \"TRANSFERRIN\", \"IRONSAT\", \"TIBC\", \"FERRITIN\"      ASSESSMENT/PLAN     Mr. Koenig is a 64 y.o. male and returns in followup for the following issues:    OBSTRUCTIVE SLEEP APNEA  -Benefiting from OAT  -Ordering sleep study to verify KIM well controlled with OAT    IPF  -Followed by pulm    Follow up 5 months         "

## 2024-07-23 DIAGNOSIS — J84.112 IPF (IDIOPATHIC PULMONARY FIBROSIS) (MULTI): ICD-10-CM

## 2024-07-23 DIAGNOSIS — E78.5 DYSLIPIDEMIA: ICD-10-CM

## 2024-07-23 RX ORDER — ATORVASTATIN CALCIUM 20 MG/1
20 TABLET, FILM COATED ORAL DAILY
Qty: 90 TABLET | Refills: 0 | Status: SHIPPED | OUTPATIENT
Start: 2024-07-23 | End: 2025-07-23

## 2024-07-24 ENCOUNTER — OFFICE VISIT (OUTPATIENT)
Dept: SLEEP MEDICINE | Facility: CLINIC | Age: 64
End: 2024-07-24
Payer: COMMERCIAL

## 2024-07-24 ENCOUNTER — LAB (OUTPATIENT)
Dept: LAB | Facility: LAB | Age: 64
End: 2024-07-24
Payer: COMMERCIAL

## 2024-07-24 VITALS
HEART RATE: 53 BPM | HEIGHT: 74 IN | OXYGEN SATURATION: 97 % | BODY MASS INDEX: 28.23 KG/M2 | SYSTOLIC BLOOD PRESSURE: 137 MMHG | DIASTOLIC BLOOD PRESSURE: 82 MMHG | WEIGHT: 220 LBS

## 2024-07-24 DIAGNOSIS — J84.112 IPF (IDIOPATHIC PULMONARY FIBROSIS) (MULTI): ICD-10-CM

## 2024-07-24 DIAGNOSIS — G47.33 OSA (OBSTRUCTIVE SLEEP APNEA): Primary | ICD-10-CM

## 2024-07-24 LAB
ALBUMIN SERPL BCP-MCNC: 4 G/DL (ref 3.4–5)
ALP SERPL-CCNC: 45 U/L (ref 33–136)
ALT SERPL W P-5'-P-CCNC: 7 U/L (ref 10–52)
AST SERPL W P-5'-P-CCNC: 23 U/L (ref 9–39)
BILIRUB DIRECT SERPL-MCNC: 0.1 MG/DL (ref 0–0.3)
BILIRUB SERPL-MCNC: 0.6 MG/DL (ref 0–1.2)
PROT SERPL-MCNC: 6.4 G/DL (ref 6.4–8.2)

## 2024-07-24 PROCEDURE — 1159F MED LIST DOCD IN RCRD: CPT | Performed by: PHYSICIAN ASSISTANT

## 2024-07-24 PROCEDURE — 99213 OFFICE O/P EST LOW 20 MIN: CPT | Performed by: PHYSICIAN ASSISTANT

## 2024-07-24 PROCEDURE — G2211 COMPLEX E/M VISIT ADD ON: HCPCS | Performed by: PHYSICIAN ASSISTANT

## 2024-07-24 PROCEDURE — 80076 HEPATIC FUNCTION PANEL: CPT

## 2024-07-24 PROCEDURE — 1036F TOBACCO NON-USER: CPT | Performed by: PHYSICIAN ASSISTANT

## 2024-07-24 PROCEDURE — 1126F AMNT PAIN NOTED NONE PRSNT: CPT | Performed by: PHYSICIAN ASSISTANT

## 2024-07-24 PROCEDURE — 1160F RVW MEDS BY RX/DR IN RCRD: CPT | Performed by: PHYSICIAN ASSISTANT

## 2024-07-24 PROCEDURE — 36415 COLL VENOUS BLD VENIPUNCTURE: CPT

## 2024-07-24 PROCEDURE — 3008F BODY MASS INDEX DOCD: CPT | Performed by: PHYSICIAN ASSISTANT

## 2024-07-24 RX ORDER — PIRFENIDONE 801 MG/1
TABLET, FILM COATED ORAL
Qty: 270 TABLET | Refills: 0 | Status: SHIPPED | OUTPATIENT
Start: 2024-07-24 | End: 2025-07-24

## 2024-07-24 ASSESSMENT — LIFESTYLE VARIABLES
HOW OFTEN DO YOU HAVE A DRINK CONTAINING ALCOHOL: 2-3 TIMES A WEEK
HOW OFTEN DO YOU HAVE SIX OR MORE DRINKS ON ONE OCCASION: NEVER
AUDIT-C TOTAL SCORE: 3
HOW MANY STANDARD DRINKS CONTAINING ALCOHOL DO YOU HAVE ON A TYPICAL DAY: 1 OR 2
SKIP TO QUESTIONS 9-10: 1

## 2024-07-24 ASSESSMENT — SLEEP AND FATIGUE QUESTIONNAIRES
HOW LIKELY ARE YOU TO NOD OFF OR FALL ASLEEP WHILE LYING DOWN TO REST IN THE AFTERNOON WHEN CIRCUMSTANCES PERMIT: MODERATE CHANCE OF DOZING
HOW LIKELY ARE YOU TO NOD OFF OR FALL ASLEEP WHEN YOU ARE A PASSENGER IN A CAR FOR AN HOUR WITHOUT A BREAK: WOULD NEVER DOZE
HOW LIKELY ARE YOU TO NOD OFF OR FALL ASLEEP WHILE SITTING AND TALKING TO SOMEONE: WOULD NEVER DOZE
HOW LIKELY ARE YOU TO NOD OFF OR FALL ASLEEP WHILE SITTING AND READING: WOULD NEVER DOZE
HOW LIKELY ARE YOU TO NOD OFF OR FALL ASLEEP WHILE SITTING QUIETLY AFTER LUNCH WITHOUT ALCOHOL: WOULD NEVER DOZE
HOW LIKELY ARE YOU TO NOD OFF OR FALL ASLEEP IN A CAR, WHILE STOPPED FOR A FEW MINUTES IN TRAFFIC: WOULD NEVER DOZE
HOW LIKELY ARE YOU TO NOD OFF OR FALL ASLEEP WHILE WATCHING TV: MODERATE CHANCE OF DOZING
ESS-CHAD TOTAL SCORE: 4
SITING INACTIVE IN A PUBLIC PLACE LIKE A CLASS ROOM OR A MOVIE THEATER: WOULD NEVER DOZE

## 2024-07-24 ASSESSMENT — PATIENT HEALTH QUESTIONNAIRE - PHQ9
1. LITTLE INTEREST OR PLEASURE IN DOING THINGS: NOT AT ALL
SUM OF ALL RESPONSES TO PHQ9 QUESTIONS 1 & 2: 0
2. FEELING DOWN, DEPRESSED OR HOPELESS: NOT AT ALL

## 2024-07-24 ASSESSMENT — PAIN SCALES - GENERAL: PAINLEVEL: 0-NO PAIN

## 2024-07-24 NOTE — PATIENT INSTRUCTIONS
Thank you for coming to the Sleep Medicine Clinic today! Your sleep medicine provider today was: Herbert Pizarro PA-C Below is a summary of your treatment plan, other important information, and our contact numbers:      TREATMENT PLAN     Call [phone]), option 3 to schedule your sleep study. When you have an appointment please call us back at [phone] to schedule a followup appointment 3-4 weeks after to review results.    Obstructive Sleep Apnea (KIM) is a sleep disorder where your upper airway muscles relax during sleep and the airway intermittently and repetitively narrows and collapses leading to partially blocked airway (hypopnea) or completely blocked airway (apnea) which, in turn, can disrupt breathing in sleep, lower oxygen levels while you sleep and cause night time wakings. Because both apnea and hypopnea may cause higher carbon dioxide or low oxygen levels, untreated KIM can lead to heart arrhythmia, elevation of blood pressure, and make it harder for the body to consolidate memory and facilitate metabolism (leading to higher blood sugars at night). Frequent partial arousals occur during sleep resulting in sleep deprivation and daytime sleepiness. KIM is associated with an increased risk of cardiovascular disease, stroke, hypertension, and insulin resistance. Moreover, untreated KIM with excessive daytime sleepiness can increase the risk of motor vehicular accidents.    Some conservative strategies for KIM regardless of KIM severity are:   Positional therapy - Avoid sleeping on your back.   Healthy diet and regular exercise to optimize weight is highly encouraged.   Avoid alcohol late in the evening and sedative-hypnotics as these substances can make sleep apnea worse.   Improve breathing through the nose with intranasal steroid spray, saline rinse, or antihistamines    Safety: Avoid driving vehicle and operating heavy equipment while sleepy. Drowsy driving may lead to life-threatening  motor vehicle accidents. A person driving while sleepy is 5 times more likely to have an accident. If you feel sleepy, pull over and take a short power nap (sleep for less than 30 minutes). Otherwise, ask somebody to drive you.    Treatment options for sleep apnea include weight management, positional therapy, Positive Airway Therapy (PAP) therapy, oral appliance therapy, hypoglossal nerve stimulator (Inspire) and select airway surgeries.      OUR SLEEP TESTING LOCATIONS     Our team will contact you to schedule your sleep study, however, you can contact us as follow:  Main Phone Line (scheduling only): [phone]), option 3  Adult and Pediatric Locations  [facility] (6 years and older): [facility] - 4th floor ([address]) After hours line: [phone]  [facility] (Main campus: All ages): [facility], 6th floor. After hours line: [phone]   [place] (18 years and older): [address] [facility], 2nd floor   [place] (18 years and older): [address] [facility]; 4th floor  After hours line: [phone]  [facility] (18 years and older) at [place]: [address] [facility]  After hours line: [phone]    [place] (5 years and older; younger considered on case-by-case basis): [address] [facility]; [address], [address]. Scheduling  After hours line: [phone]   [place] (6 years and older): [address] ; [address]; [address]   [place] (6 years and older): [address] [facility], [address]  After hours line: [phone]   [place] (13 years and older) in [place]: [address], 2nd floor  After hours line: [phone]   [place] (13 year and older): [address]  After hours line: [phone]      IMPORTANT PHONE NUMBERS     Sleep Medicine Clinic Fax: [phone]  Appointments (for Adult Sleep Clinic): [phone] - option 2  Appointments (For Sleep Studies):  [phone] - option 3  Behavioral Sleep Medicine: [phone]    [facility] ([facility]): [phone]  For clinical questions and refilling prescriptions: [phone]  Becki Covarrubias (For [facility]): P: [phone]  F: [phone]       CONTACTING YOUR SLEEP MEDICINE PROVIDER     Send a message directly to your provider through \"My Chart\", which is the email service through your  Records Account: [email or web address]   Call [phone] and leave a message. One of the administrative assistants will forward the message to your sleep medicine provider through \"My Chart\" and/or email.     Your sleep medicine provider for this visit was: Herbert Pizarro PA-C  "

## 2024-07-29 ENCOUNTER — APPOINTMENT (OUTPATIENT)
Dept: AUDIOLOGY | Facility: CLINIC | Age: 64
End: 2024-07-29
Payer: COMMERCIAL

## 2024-07-29 DIAGNOSIS — H90.3 SENSORINEURAL HEARING LOSS (SNHL) OF BOTH EARS: Primary | ICD-10-CM

## 2024-07-29 DIAGNOSIS — H93.13 TINNITUS OF BOTH EARS: ICD-10-CM

## 2024-07-29 NOTE — PROGRESS NOTES
AUDIOLOGY  HEARING AID FITTING    Name:  Micky Koenig  :  1960  Age:  64 y.o.  Date of Fitting:  2024    Reason for visit: Mr. Koenig is seen in the clinic today for a hearing aid fitting appointment.    HISTORY  Audiogram dated 2024 revealed normal hearing sensitivity through 750 Hz with a mild to moderately-severe sensorineural hearing loss in the right ear, and normal hearing sensitivity through 750 Hz with a mild to severe sensorineural hearing loss in the left ear. Word recognition ability was excellent bilaterally.     Patient has never worn hearing aids.     HEARING AID FITTING  Hearing aids were dispensed today 24: Binaural ReSound Nexia 9 [id number] miniRIE rechargeable  in the ear (LILIANA) hearing aids in sparkling silver with 1L receivers, medium closed domes, and no retention lines (right serial number [id number], left serial number [id number]). Hearing aid repair and loss/damage warranties end 2027.  repair warranty ends 2027 and  loss/damage warranty ends 2025. One year in-office service plan ends 2025. Self-pay with insurance benefit expected. Connected to iPhone for frank and streaming. New user. [employer].    Patient was fit with the hearing aids listed above using the hearing aid 's proprietary fitting formula during today's visit. Hearing aids are set as follows: 100% target gain, sound shaper is off, push button is set as a short push right raise left lower volume control and long push no function (extra long push is power off/on), tap control is on, and feedback manager was calibrated.    Real ear aided response (REAR) probe microphone measurements verified a satisfactory fit using NAL-NL2 targets for average inputs (NAL = [employer]). Normal conversational speech was comfortable and easily understood. Extensive counseling was provided regarding the care and use of these devices. Counseling  and instruction were provided at a level appropriate for patient's age and sophistication level.     Patient did well operating the hearing aids and expressed understanding of the care and use. Patient performed an adequate return demonstration of insertion, removal, charging, cleaning, operation of the hearing aids, and use of the frank. Hearing aids were paired to the patient's cell phone today per the patient's request. The following educational materials were provided: instructional brochures.    Patient expressed satisfaction with the sound quality and physical fit of the devices. Offered to schedule a follow-up appointment in 2-3 weeks to assess progress with the hearing aids; however, patient deferred. Instructed to contact me should there be any additional questions, or any problems arise. Recommend annual audiologic evaluation and annual appointment for routine hearing aid maintenance, sooner if needed.    IMPRESSIONS  Hearing aids were fit today. Prognosis with the hearing aids is good.    RECOMMENDATIONS  - Wear hearing aids.  - Contact the clinic if questions/problems arise.  - Annual audiologic evaluation, sooner if an acute change is noted.  - Follow-up with audiology annually for routine hearing aid maintenance, sooner if needed.  - Follow-up with medical care team as planned.    Canlifet reminder message scheduled? Yes.    PATIENT EDUCATION  Discussed results, impressions and recommendations with the patient. Questions were addressed and the patient was encouraged to contact our office should concerns arise.    CHARGE CAPTURE  $3380; paper encounter form utilized and submitted to  for payment processing and billing to insurance.    Time for this encounter: 4260-1139    Catrina Roblero, CCC-A  Licensed Audiologist

## 2024-08-05 PROCEDURE — RXMED WILLOW AMBULATORY MEDICATION CHARGE

## 2024-08-07 ENCOUNTER — PHARMACY VISIT (OUTPATIENT)
Dept: PHARMACY | Facility: CLINIC | Age: 64
End: 2024-08-07
Payer: MEDICARE

## 2024-08-08 ENCOUNTER — PHARMACY VISIT (OUTPATIENT)
Dept: PHARMACY | Facility: CLINIC | Age: 64
End: 2024-08-08
Payer: MEDICARE

## 2024-08-14 ENCOUNTER — TELEPHONE (OUTPATIENT)
Dept: PULMONOLOGY | Facility: HOSPITAL | Age: 64
End: 2024-08-14
Payer: COMMERCIAL

## 2024-08-14 NOTE — TELEPHONE ENCOUNTER
Pt reached out regarding his pirfenidone. He stated that he recently retired and his insurance changed. He now needs a prior authorization for his pirfenidone. The PA was completed with an approval determination. Pt was updated on the approval of his pirfenidone. Authorization #[id number] good from 7/1/2024 - 8/14/2025.

## 2024-08-20 ENCOUNTER — HOSPITAL ENCOUNTER (OUTPATIENT)
Dept: CARDIOLOGY | Facility: CLINIC | Age: 64
Discharge: HOME | End: 2024-08-20
Payer: MEDICARE

## 2024-08-20 DIAGNOSIS — J84.112 IPF (IDIOPATHIC PULMONARY FIBROSIS) (MULTI): ICD-10-CM

## 2024-08-20 DIAGNOSIS — Z13.6 ENCOUNTER FOR SCREENING FOR CARDIOVASCULAR DISORDERS: ICD-10-CM

## 2024-08-20 LAB
AORTIC VALVE MEAN GRADIENT: 5 MMHG
AORTIC VALVE PEAK VELOCITY: 1.49 M/S
AV PEAK GRADIENT: 8.9 MMHG
AVA (PEAK VEL): 3.51 CM2
AVA (VTI): 3.33 CM2
EJECTION FRACTION APICAL 4 CHAMBER: 68.5
EJECTION FRACTION: 63 %
LEFT ATRIUM VOLUME AREA LENGTH INDEX BSA: 40.5 ML/M2
LEFT VENTRICLE INTERNAL DIMENSION DIASTOLE: 4.4 CM (ref 3.5–6)
LEFT VENTRICULAR OUTFLOW TRACT DIAMETER: 2.3 CM
MITRAL VALVE E/A RATIO: 1.32
MITRAL VALVE E/E' RATIO: 6.9
RIGHT VENTRICLE FREE WALL PEAK S': 14.3 CM/S
RIGHT VENTRICLE PEAK SYSTOLIC PRESSURE: 62.4 MMHG
TRICUSPID ANNULAR PLANE SYSTOLIC EXCURSION: 2.7 CM

## 2024-08-20 PROCEDURE — 93306 TTE W/DOPPLER COMPLETE: CPT

## 2024-08-20 PROCEDURE — 93306 TTE W/DOPPLER COMPLETE: CPT | Performed by: INTERNAL MEDICINE

## 2024-08-28 ENCOUNTER — SPECIALTY PHARMACY (OUTPATIENT)
Dept: PHARMACY | Facility: CLINIC | Age: 64
End: 2024-08-28

## 2024-09-04 ENCOUNTER — HOSPITAL ENCOUNTER (OUTPATIENT)
Dept: RESPIRATORY THERAPY | Facility: HOSPITAL | Age: 64
Discharge: HOME | End: 2024-09-04
Payer: MEDICARE

## 2024-09-04 ENCOUNTER — APPOINTMENT (OUTPATIENT)
Dept: RESPIRATORY THERAPY | Facility: HOSPITAL | Age: 64
End: 2024-09-04
Payer: COMMERCIAL

## 2024-09-04 DIAGNOSIS — J84.112 IPF (IDIOPATHIC PULMONARY FIBROSIS) (MULTI): ICD-10-CM

## 2024-09-04 PROCEDURE — 94618 PULMONARY STRESS TESTING: CPT

## 2024-09-04 PROCEDURE — 94618 PULMONARY STRESS TESTING: CPT | Performed by: INTERNAL MEDICINE

## 2024-09-04 PROCEDURE — 94729 DIFFUSING CAPACITY: CPT | Performed by: INTERNAL MEDICINE

## 2024-09-04 PROCEDURE — 94060 EVALUATION OF WHEEZING: CPT | Performed by: INTERNAL MEDICINE

## 2024-09-05 PROCEDURE — RXMED WILLOW AMBULATORY MEDICATION CHARGE

## 2024-09-06 ENCOUNTER — PHARMACY VISIT (OUTPATIENT)
Dept: PHARMACY | Facility: CLINIC | Age: 64
End: 2024-09-06
Payer: MEDICARE

## 2024-09-10 PROCEDURE — RXMED WILLOW AMBULATORY MEDICATION CHARGE

## 2024-09-13 ENCOUNTER — PHARMACY VISIT (OUTPATIENT)
Dept: PHARMACY | Facility: CLINIC | Age: 64
End: 2024-09-13
Payer: MEDICARE

## 2024-09-26 PROCEDURE — RXMED WILLOW AMBULATORY MEDICATION CHARGE

## 2024-09-27 ENCOUNTER — SPECIALTY PHARMACY (OUTPATIENT)
Dept: PHARMACY | Facility: CLINIC | Age: 64
End: 2024-09-27

## 2024-09-27 ENCOUNTER — LAB (OUTPATIENT)
Dept: LAB | Facility: LAB | Age: 64
End: 2024-09-27
Payer: MEDICARE

## 2024-09-27 DIAGNOSIS — J84.112 IPF (IDIOPATHIC PULMONARY FIBROSIS) (MULTI): ICD-10-CM

## 2024-09-27 DIAGNOSIS — M16.7 OTHER SECONDARY OSTEOARTHRITIS OF LEFT HIP: ICD-10-CM

## 2024-09-27 DIAGNOSIS — Z12.5 PROSTATE CANCER SCREENING: ICD-10-CM

## 2024-09-27 LAB
ALBUMIN SERPL BCP-MCNC: 4.3 G/DL (ref 3.4–5)
ALP SERPL-CCNC: 54 U/L (ref 33–136)
ALT SERPL W P-5'-P-CCNC: 11 U/L (ref 10–52)
ANION GAP SERPL CALC-SCNC: 12 MMOL/L (ref 10–20)
APPEARANCE UR: CLEAR
AST SERPL W P-5'-P-CCNC: 26 U/L (ref 9–39)
BASOPHILS # BLD AUTO: 0.05 X10*3/UL (ref 0–0.1)
BASOPHILS NFR BLD AUTO: 0.7 %
BILIRUB SERPL-MCNC: 0.7 MG/DL (ref 0–1.2)
BILIRUB UR STRIP.AUTO-MCNC: NEGATIVE MG/DL
BUN SERPL-MCNC: 13 MG/DL (ref 6–23)
CALCIUM SERPL-MCNC: 9.8 MG/DL (ref 8.6–10.6)
CHLORIDE SERPL-SCNC: 103 MMOL/L (ref 98–107)
CO2 SERPL-SCNC: 30 MMOL/L (ref 21–32)
COLOR UR: NORMAL
CREAT SERPL-MCNC: 0.78 MG/DL (ref 0.5–1.3)
EGFRCR SERPLBLD CKD-EPI 2021: >90 ML/MIN/1.73M*2
EOSINOPHIL # BLD AUTO: 0.35 X10*3/UL (ref 0–0.7)
EOSINOPHIL NFR BLD AUTO: 4.7 %
ERYTHROCYTE [DISTWIDTH] IN BLOOD BY AUTOMATED COUNT: 13 % (ref 11.5–14.5)
GLUCOSE SERPL-MCNC: 101 MG/DL (ref 74–99)
GLUCOSE UR STRIP.AUTO-MCNC: NORMAL MG/DL
HCT VFR BLD AUTO: 45 % (ref 41–52)
HGB BLD-MCNC: 15.3 G/DL (ref 13.5–17.5)
IMM GRANULOCYTES # BLD AUTO: 0.02 X10*3/UL (ref 0–0.7)
IMM GRANULOCYTES NFR BLD AUTO: 0.3 % (ref 0–0.9)
KETONES UR STRIP.AUTO-MCNC: NEGATIVE MG/DL
LEUKOCYTE ESTERASE UR QL STRIP.AUTO: NEGATIVE
LYMPHOCYTES # BLD AUTO: 2.87 X10*3/UL (ref 1.2–4.8)
LYMPHOCYTES NFR BLD AUTO: 38.9 %
MCH RBC QN AUTO: 31.7 PG (ref 26–34)
MCHC RBC AUTO-ENTMCNC: 34 G/DL (ref 32–36)
MCV RBC AUTO: 93 FL (ref 80–100)
MONOCYTES # BLD AUTO: 0.68 X10*3/UL (ref 0.1–1)
MONOCYTES NFR BLD AUTO: 9.2 %
NEUTROPHILS # BLD AUTO: 3.41 X10*3/UL (ref 1.2–7.7)
NEUTROPHILS NFR BLD AUTO: 46.2 %
NITRITE UR QL STRIP.AUTO: NEGATIVE
NRBC BLD-RTO: 0 /100 WBCS (ref 0–0)
PH UR STRIP.AUTO: 5 [PH]
PLATELET # BLD AUTO: 290 X10*3/UL (ref 150–450)
POTASSIUM SERPL-SCNC: 4.3 MMOL/L (ref 3.5–5.3)
PROT SERPL-MCNC: 6.8 G/DL (ref 6.4–8.2)
PROT UR STRIP.AUTO-MCNC: NEGATIVE MG/DL
PSA SERPL-MCNC: 1.02 NG/ML
RBC # BLD AUTO: 4.82 X10*6/UL (ref 4.5–5.9)
RBC # UR STRIP.AUTO: NEGATIVE /UL
SODIUM SERPL-SCNC: 141 MMOL/L (ref 136–145)
SP GR UR STRIP.AUTO: 1.01
UROBILINOGEN UR STRIP.AUTO-MCNC: NORMAL MG/DL
WBC # BLD AUTO: 7.4 X10*3/UL (ref 4.4–11.3)

## 2024-09-27 PROCEDURE — G0103 PSA SCREENING: HCPCS

## 2024-09-27 PROCEDURE — 81003 URINALYSIS AUTO W/O SCOPE: CPT

## 2024-09-27 PROCEDURE — 36415 COLL VENOUS BLD VENIPUNCTURE: CPT

## 2024-09-27 PROCEDURE — 85025 COMPLETE CBC W/AUTO DIFF WBC: CPT

## 2024-09-27 PROCEDURE — 80053 COMPREHEN METABOLIC PANEL: CPT

## 2024-09-30 ENCOUNTER — PHARMACY VISIT (OUTPATIENT)
Dept: PHARMACY | Facility: CLINIC | Age: 64
End: 2024-09-30
Payer: MEDICARE

## 2024-09-30 PROCEDURE — RXMED WILLOW AMBULATORY MEDICATION CHARGE

## 2024-10-01 ENCOUNTER — APPOINTMENT (OUTPATIENT)
Dept: PRIMARY CARE | Facility: CLINIC | Age: 64
End: 2024-10-01
Payer: COMMERCIAL

## 2024-10-01 ENCOUNTER — APPOINTMENT (OUTPATIENT)
Dept: PULMONOLOGY | Facility: CLINIC | Age: 64
End: 2024-10-01
Payer: COMMERCIAL

## 2024-10-01 VITALS
WEIGHT: 222 LBS | HEIGHT: 74 IN | BODY MASS INDEX: 28.49 KG/M2 | SYSTOLIC BLOOD PRESSURE: 152 MMHG | OXYGEN SATURATION: 95 % | DIASTOLIC BLOOD PRESSURE: 84 MMHG | HEART RATE: 54 BPM

## 2024-10-01 DIAGNOSIS — E78.00 PURE HYPERCHOLESTEROLEMIA: ICD-10-CM

## 2024-10-01 DIAGNOSIS — J30.9 ALLERGIC RHINITIS, UNSPECIFIED SEASONALITY, UNSPECIFIED TRIGGER: ICD-10-CM

## 2024-10-01 DIAGNOSIS — Z23 FLU VACCINE NEED: ICD-10-CM

## 2024-10-01 DIAGNOSIS — M16.0 OSTEOARTHRITIS OF BOTH HIPS, UNSPECIFIED OSTEOARTHRITIS TYPE: ICD-10-CM

## 2024-10-01 DIAGNOSIS — R73.9 HYPERGLYCEMIA: ICD-10-CM

## 2024-10-01 DIAGNOSIS — Z23 NEED FOR VACCINATION: ICD-10-CM

## 2024-10-01 DIAGNOSIS — J84.112 IPF (IDIOPATHIC PULMONARY FIBROSIS) (MULTI): ICD-10-CM

## 2024-10-01 DIAGNOSIS — Z00.00 ROUTINE GENERAL MEDICAL EXAMINATION AT HEALTH CARE FACILITY: Primary | ICD-10-CM

## 2024-10-01 PROCEDURE — 3008F BODY MASS INDEX DOCD: CPT | Performed by: INTERNAL MEDICINE

## 2024-10-01 PROCEDURE — 1170F FXNL STATUS ASSESSED: CPT | Performed by: INTERNAL MEDICINE

## 2024-10-01 PROCEDURE — 1159F MED LIST DOCD IN RCRD: CPT | Performed by: INTERNAL MEDICINE

## 2024-10-01 PROCEDURE — 1126F AMNT PAIN NOTED NONE PRSNT: CPT | Performed by: INTERNAL MEDICINE

## 2024-10-01 PROCEDURE — 90662 IIV NO PRSV INCREASED AG IM: CPT | Performed by: INTERNAL MEDICINE

## 2024-10-01 PROCEDURE — 1160F RVW MEDS BY RX/DR IN RCRD: CPT | Performed by: INTERNAL MEDICINE

## 2024-10-01 PROCEDURE — G0008 ADMIN INFLUENZA VIRUS VAC: HCPCS | Performed by: INTERNAL MEDICINE

## 2024-10-01 ASSESSMENT — ENCOUNTER SYMPTOMS
DEPRESSION: 0
LOSS OF SENSATION IN FEET: 0
OCCASIONAL FEELINGS OF UNSTEADINESS: 0

## 2024-10-01 ASSESSMENT — ACTIVITIES OF DAILY LIVING (ADL)
GROCERY_SHOPPING: INDEPENDENT
MANAGING_FINANCES: INDEPENDENT
TAKING_MEDICATION: INDEPENDENT
DRESSING: INDEPENDENT
DOING_HOUSEWORK: INDEPENDENT
BATHING: INDEPENDENT

## 2024-10-01 ASSESSMENT — PATIENT HEALTH QUESTIONNAIRE - PHQ9
1. LITTLE INTEREST OR PLEASURE IN DOING THINGS: NOT AT ALL
1. LITTLE INTEREST OR PLEASURE IN DOING THINGS: NOT AT ALL
SUM OF ALL RESPONSES TO PHQ9 QUESTIONS 1 AND 2: 0
2. FEELING DOWN, DEPRESSED OR HOPELESS: NOT AT ALL
SUM OF ALL RESPONSES TO PHQ9 QUESTIONS 1 AND 2: 0
2. FEELING DOWN, DEPRESSED OR HOPELESS: NOT AT ALL

## 2024-10-01 ASSESSMENT — PAIN SCALES - GENERAL: PAINLEVEL: 0-NO PAIN

## 2024-10-01 NOTE — ASSESSMENT & PLAN NOTE
He will continue atorvastatin regarding dyslipidemia and will follow low-cholesterol diet.  Orders:    Lipid Panel; Future

## 2024-10-01 NOTE — ASSESSMENT & PLAN NOTE
He will continue pirfenidone regarding history of IPF and will also continue to follow-up with pulmonary clinic.  Orders:    Basic metabolic panel; Future

## 2024-10-01 NOTE — PROGRESS NOTES
"Subjective   Reason for Visit: Micky Koenig is an 64 y.o. male here for a Medicare Wellness visit.          Reviewed all medications by prescribing practitioner or clinical pharmacist (such as prescriptions, OTCs, herbal therapies and supplements) and documented in the medical record.    HPIpatient presents to clinic for Medicare wellness exam and follow-up follow-up visit on history of severe DJD of hips, coronary atherosclerosis,allergic rhinitis, IPF, KIM,mild obesity status post left direct anterior total hip arthroplasty on 5/11/2023 and right direct total hip arthroplasty on 8/7/2023 secondary to severe DJD of hips and dyslipidemia.    He is doing well and denies any cough, congestion fever chills palpitation and abdominal pain.  Laboratory studies done shows hemoglobin of 15.3.,glucose of 101 and other studies are unremarkable.  He had normal colonoscopy last year    Patient Care Team:  Viktor Muñiz MD as PCP - General (Internal Medicine)     Review of Systems   Constitutional: Negative.    HENT: Negative.     Eyes: Negative.    Respiratory: Negative.     Cardiovascular: Negative.    Gastrointestinal: Negative.    Endocrine: Negative.    Genitourinary: Negative.    Musculoskeletal: Negative.    Skin: Negative.    Allergic/Immunologic: Negative.    Neurological: Negative.    Hematological: Negative.    Psychiatric/Behavioral: Negative.         Objective   Vitals:  /84 (BP Location: Left arm, Patient Position: Sitting)   Pulse 54   Ht 1.88 m (6' 2\")   Wt 101 kg (222 lb)   SpO2 95%   BMI 28.50 kg/m²       Physical Exam  Constitutional:       Appearance: Normal appearance. He is normal weight.   HENT:      Right Ear: Tympanic membrane normal.      Left Ear: Tympanic membrane and ear canal normal.      Nose: Nose normal.   Neck:      Vascular: No carotid bruit.   Cardiovascular:      Rate and Rhythm: Normal rate.   Pulmonary:      Effort: No respiratory distress.      Breath sounds: No stridor. No " wheezing.   Abdominal:      Palpations: Abdomen is soft.      Tenderness: There is no abdominal tenderness. There is no guarding or rebound.   Skin:     Coloration: Skin is not jaundiced.      Findings: No bruising.   Neurological:      General: No focal deficit present.      Mental Status: He is alert and oriented to person, place, and time.   Psychiatric:         Mood and Affect: Mood normal.         Assessment & Plan  Routine general medical examination at health care facility  Patient will be scheduled for routine blood work and will be given influenza vaccine for health maintenance.  Orders:    1 Year Follow Up In Primary Care - Wellness Exam; Future    Pure hypercholesterolemia  He will continue atorvastatin regarding dyslipidemia and will follow low-cholesterol diet.  Orders:    Lipid Panel; Future    IPF (idiopathic pulmonary fibrosis) (Multi)  He will continue pirfenidone regarding history of IPF and will also continue to follow-up with pulmonary clinic.  Orders:    Basic metabolic panel; Future    Allergic rhinitis, unspecified seasonality, unspecified trigger  He is encouraged to drink fluids and will continue Zyrtec over-the-counter for allergy symptoms.       Osteoarthritis of both hips, unspecified osteoarthritis type  He is advised to take Tylenol regarding arthritis of hips       Need for vaccination  He is given influenza vaccine for health maintenance  Orders:    Flu vaccine, high dose    Hyperglycemia  Will monitor serum glucose and is encouraged to follow ADA 2000-calorie diet.  He will return to clinic in 6 months for follow-up visit.  I  Orders:    Hemoglobin A1c; Future    Flu vaccine need

## 2024-10-03 ENCOUNTER — PHARMACY VISIT (OUTPATIENT)
Dept: PHARMACY | Facility: CLINIC | Age: 64
End: 2024-10-03
Payer: MEDICARE

## 2024-10-05 ENCOUNTER — APPOINTMENT (OUTPATIENT)
Dept: PRIMARY CARE | Facility: CLINIC | Age: 64
End: 2024-10-05
Payer: COMMERCIAL

## 2024-10-05 ASSESSMENT — ENCOUNTER SYMPTOMS
ENDOCRINE NEGATIVE: 1
GASTROINTESTINAL NEGATIVE: 1
NEUROLOGICAL NEGATIVE: 1
PSYCHIATRIC NEGATIVE: 1
CARDIOVASCULAR NEGATIVE: 1
MUSCULOSKELETAL NEGATIVE: 1
HEMATOLOGIC/LYMPHATIC NEGATIVE: 1
RESPIRATORY NEGATIVE: 1
EYES NEGATIVE: 1
ALLERGIC/IMMUNOLOGIC NEGATIVE: 1
CONSTITUTIONAL NEGATIVE: 1

## 2024-10-07 PROCEDURE — RXMED WILLOW AMBULATORY MEDICATION CHARGE

## 2024-10-08 ENCOUNTER — OFFICE VISIT (OUTPATIENT)
Dept: PULMONOLOGY | Facility: CLINIC | Age: 64
End: 2024-10-08
Payer: MEDICARE

## 2024-10-08 ENCOUNTER — PHARMACY VISIT (OUTPATIENT)
Dept: PHARMACY | Facility: CLINIC | Age: 64
End: 2024-10-08
Payer: MEDICARE

## 2024-10-08 VITALS
BODY MASS INDEX: 28.49 KG/M2 | HEIGHT: 74 IN | WEIGHT: 222 LBS | RESPIRATION RATE: 18 BRPM | DIASTOLIC BLOOD PRESSURE: 89 MMHG | HEART RATE: 65 BPM | SYSTOLIC BLOOD PRESSURE: 150 MMHG | OXYGEN SATURATION: 95 %

## 2024-10-08 DIAGNOSIS — J84.112 IPF (IDIOPATHIC PULMONARY FIBROSIS) (MULTI): Primary | ICD-10-CM

## 2024-10-08 PROCEDURE — 99213 OFFICE O/P EST LOW 20 MIN: CPT | Performed by: INTERNAL MEDICINE

## 2024-10-08 PROCEDURE — 1036F TOBACCO NON-USER: CPT | Performed by: INTERNAL MEDICINE

## 2024-10-08 PROCEDURE — 3008F BODY MASS INDEX DOCD: CPT | Performed by: INTERNAL MEDICINE

## 2024-10-08 PROCEDURE — 1126F AMNT PAIN NOTED NONE PRSNT: CPT | Performed by: INTERNAL MEDICINE

## 2024-10-08 ASSESSMENT — PAIN SCALES - GENERAL: PAINLEVEL: 0-NO PAIN

## 2024-10-08 NOTE — PROGRESS NOTES
Mr. HADLEY LEWIS is a 63 year White M here for FUV IPF   Interval 10/8/2024   Pt is seeking second opinion at the [facility] regarding lung transplant ad will follow up with them moving forward. He expressed his gratitude for the care he received from our team and I wished him the best of luck and a best outcome.      Interval 7/18/2024  Doing good with the pirfenidone 801 TID no major side effects.  He was offered early alf.  He is much better now with his diet and exercise.  No cough no shortness of breath.  He was some production when he golfs and works in the yard.  He finished pulmonary rehab    PE:  /89   Pulse 64   Temp 36.1 °C (96.9 °F)   Wt 101 kg (223 lb 4.8 oz)   SpO2 96%   BMI 28.67 kg/m²   Crackles at the bases     Interim 3/14/2024  He is doing well overall.  He is enrolled in pulmonary rehab.  He is wearing his oxygen in the rehab and at night but not otherwise.  No weight loss or loss of appetite.  The pirfenidone is well-tolerated.  PE:  Wt 105 kg (231 lb)   BMI 30.82 kg/m²   Lungs: Bibasilar crackles  He completed his pulmonary function test, echocardiography, chest CT and sleep study; results are detailed below     Interim 09 27 2023   He continues to do very well he underwent right hip surgery and is recovering nicely.  Lungs: bilateral crackles at bases. POX 95%            Interim 06 21 2023   He is doing overall really well. He denies any cough sputum production wheezing chest tightness. He is about 6 weeks post left hip replacement and feels great. He is scheduled to have his right hip in 2 weeks. He lost about 20 pounds in the last few months intentionally.     Lungs: Bibasilar Velcro crackles. Pulse manually checked 62. He denies any dizziness lightheadedness chest pain.  LFT's April 2023 wnl     interim 03 08 2023      No new symptoms. cough seems better after starting Flonase. January. He is tolerating it very well no significant side effects specifically no GI  upset and no significant fatigue     LFTS wnl     History of Present Illness   Mr. Koenig has a persistent non-productive cough since he had an upper respiratory tract infection in December 2019. Prior to that time, he had intermittent cough typically related to sinus symptoms that occurred at season change and responded well to nasal sprays. Mr. Koenig recently had an annual physical and CXR was done that showed abnormalities that was followed up with CT chest. Mr. Koenig walks daily (typically ~3 miles) without limitation. He does report mild shortness of breath if he climbs 2 flights of stairs. He reports seasonal allergies triggered by grass / pollen (worse in spring). cough is manly dry. Very rare nocturnal awakening. The cough is throughout the day. There is no associated chest tightness or weakness. A few years ago he used to walk 10-20,000 steps but then he cut down to about 7000 steps per day but not necessarily due to shortness of breath but more so due to joints   overuse. No arthritic symptoms, no Raynaud's no problems swallowing. About 15 to 20 pounds in the last 2 years        Mr. Koenig thinks he likely had COVID in December 2021 - his wife tested positive and was symptomatic. He initially tested negative, but developed similar symptoms several days after the test. He tested again later, but was also negative at that time.        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 diffusely distributed  repeat HRCT August 2022 Mild bronchiectasis and bronchiolectasis distributed along both lungs. There is diffuse subpleural reticulation with no definite craniocaudal gradient. Expiratory imaging demonstrates no evidence of significant air-trapping.  01/25/2024 Findings suggesting UIP/IPF somewhat progressed from previous exam. New 7 mm nodule in the left upper lobe.         PFTs:June 2022  Ratio normal FVC in the 50% , DLCO 77  May 2023 FVC 2.76 (52%), DLCO 82 (stable)  09 2023 FVC 2.4, FEV1 :1.9 , DLCO 17 (63%)  03 2024 FVC 2.47 FEV1 :2.1 , DLCO 14 (57%)  06 2024 FVC 2.2 FEV1 1.8, DLCO 14.9 (55%)                     6MWT 09 2023 completed but results not available   6MWT 06 2024 402m, dropped to 89% on RA      Echo 01/2024    1. Left ventricular systolic function is normal with a 60-65% estimated ejection fraction.   2. Spectral Doppler shows an impaired relaxation pattern of left ventricular diastolic filling.   3. There is mild mitral, tricuspid and pulmonic regurgitation.   4. The estimated pulmonary artery pressure is mildly elevated with the RVSP at 44.3 mmHg.       Impression and Recommendations     IPF Stable clinically but pulmonary function and CT slightly worse on CT 06/2024 vs Chest CT jan 2024 and slight progression compared to 2022   -Continue pirfenidone well tolerated  -Pulmonary rehab finished in May 2024  -Continue oxygen with exertion and at night  -pt will be following up at the [facility] for continuation of treatment and lung transplant eval

## 2024-10-11 ENCOUNTER — APPOINTMENT (OUTPATIENT)
Dept: SLEEP MEDICINE | Facility: HOSPITAL | Age: 64
End: 2024-10-11
Payer: MEDICARE

## 2024-10-21 ENCOUNTER — APPOINTMENT (OUTPATIENT)
Dept: RESPIRATORY THERAPY | Facility: CLINIC | Age: 64
End: 2024-10-21
Payer: COMMERCIAL

## 2024-10-21 ENCOUNTER — SPECIALTY PHARMACY (OUTPATIENT)
Dept: PHARMACY | Facility: CLINIC | Age: 64
End: 2024-10-21

## 2024-10-21 ENCOUNTER — APPOINTMENT (OUTPATIENT)
Dept: RESPIRATORY THERAPY | Facility: HOSPITAL | Age: 64
End: 2024-10-21
Payer: COMMERCIAL

## 2024-10-21 DIAGNOSIS — J84.112 IPF (IDIOPATHIC PULMONARY FIBROSIS) (MULTI): ICD-10-CM

## 2024-10-31 RX ORDER — PIRFENIDONE 801 MG/1
TABLET, FILM COATED ORAL
Qty: 270 TABLET | Refills: 0 | Status: SHIPPED | OUTPATIENT
Start: 2024-10-31 | End: 2025-10-31

## 2024-11-01 PROCEDURE — RXMED WILLOW AMBULATORY MEDICATION CHARGE

## 2024-11-02 ENCOUNTER — PHARMACY VISIT (OUTPATIENT)
Dept: PHARMACY | Facility: CLINIC | Age: 64
End: 2024-11-02
Payer: MEDICARE

## 2024-11-05 DIAGNOSIS — E78.5 DYSLIPIDEMIA: ICD-10-CM

## 2024-11-07 PROCEDURE — RXMED WILLOW AMBULATORY MEDICATION CHARGE

## 2024-11-07 RX ORDER — ATORVASTATIN CALCIUM 20 MG/1
20 TABLET, FILM COATED ORAL DAILY
Qty: 90 TABLET | Refills: 0 | Status: SHIPPED | OUTPATIENT
Start: 2024-11-07 | End: 2025-11-07

## 2024-11-08 ENCOUNTER — PHARMACY VISIT (OUTPATIENT)
Dept: PHARMACY | Facility: CLINIC | Age: 64
End: 2024-11-08
Payer: MEDICARE

## 2024-11-15 ENCOUNTER — APPOINTMENT (OUTPATIENT)
Dept: SLEEP MEDICINE | Facility: HOSPITAL | Age: 64
End: 2024-11-15
Payer: MEDICARE

## 2024-11-25 ENCOUNTER — SPECIALTY PHARMACY (OUTPATIENT)
Dept: PHARMACY | Facility: CLINIC | Age: 64
End: 2024-11-25

## 2024-11-25 PROCEDURE — RXMED WILLOW AMBULATORY MEDICATION CHARGE

## 2024-11-26 ENCOUNTER — PHARMACY VISIT (OUTPATIENT)
Dept: PHARMACY | Facility: CLINIC | Age: 64
End: 2024-11-26
Payer: MEDICARE

## 2024-12-04 ENCOUNTER — APPOINTMENT (OUTPATIENT)
Dept: SLEEP MEDICINE | Facility: CLINIC | Age: 64
End: 2024-12-04
Payer: COMMERCIAL

## 2024-12-06 ENCOUNTER — CLINICAL SUPPORT (OUTPATIENT)
Dept: SLEEP MEDICINE | Facility: HOSPITAL | Age: 64
End: 2024-12-06
Payer: MEDICARE

## 2024-12-06 DIAGNOSIS — J84.112 IPF (IDIOPATHIC PULMONARY FIBROSIS) (MULTI): ICD-10-CM

## 2024-12-06 DIAGNOSIS — G47.33 OSA (OBSTRUCTIVE SLEEP APNEA): ICD-10-CM

## 2024-12-06 NOTE — PROGRESS NOTES
Type of Study: HOME SLEEP STUDY - NOMAD     The patient received equipment and instructions for use of the [facility] HSAT device. The patient was instructed how to apply the effort belts, cannula, thermistor. It was also explained how the Nomad and oximeter components work.  The patient was asked to record their sleep for an 8-hour period.     The patient was informed of their responsibility for the device and acknowledged this by signing the HSAT device contract. The patient was asked to return the device on 12/09/2024 to the Sleep Center.     The patient was instructed to call 911 as usual for any medical- emergencies while at home.  The patient was also given a phone number for troubleshooting when using the device in case there were additional questions.

## 2024-12-10 DIAGNOSIS — J30.9 ALLERGIC RHINITIS, UNSPECIFIED SEASONALITY, UNSPECIFIED TRIGGER: ICD-10-CM

## 2024-12-10 RX ORDER — IPRATROPIUM BROMIDE 21 UG/1
1 SPRAY, METERED NASAL 3 TIMES DAILY
Qty: 30 ML | Refills: 1 | Status: SHIPPED | OUTPATIENT
Start: 2024-12-10

## 2024-12-11 PROCEDURE — RXMED WILLOW AMBULATORY MEDICATION CHARGE

## 2024-12-12 ENCOUNTER — PHARMACY VISIT (OUTPATIENT)
Dept: PHARMACY | Facility: CLINIC | Age: 64
End: 2024-12-12
Payer: MEDICARE

## 2024-12-18 ENCOUNTER — SPECIALTY PHARMACY (OUTPATIENT)
Dept: PHARMACY | Facility: CLINIC | Age: 64
End: 2024-12-18

## 2024-12-18 PROCEDURE — RXMED WILLOW AMBULATORY MEDICATION CHARGE

## 2024-12-20 ENCOUNTER — PHARMACY VISIT (OUTPATIENT)
Dept: PHARMACY | Facility: CLINIC | Age: 64
End: 2024-12-20
Payer: MEDICARE

## 2024-12-31 ENCOUNTER — APPOINTMENT (OUTPATIENT)
Dept: SLEEP MEDICINE | Facility: CLINIC | Age: 64
End: 2024-12-31
Payer: MEDICARE

## 2024-12-31 VITALS
HEIGHT: 74 IN | DIASTOLIC BLOOD PRESSURE: 72 MMHG | WEIGHT: 214 LBS | SYSTOLIC BLOOD PRESSURE: 140 MMHG | HEART RATE: 63 BPM | BODY MASS INDEX: 27.46 KG/M2

## 2024-12-31 DIAGNOSIS — G47.33 OSA (OBSTRUCTIVE SLEEP APNEA): Primary | ICD-10-CM

## 2024-12-31 PROCEDURE — 99213 OFFICE O/P EST LOW 20 MIN: CPT | Performed by: PHYSICIAN ASSISTANT

## 2024-12-31 PROCEDURE — 1036F TOBACCO NON-USER: CPT | Performed by: PHYSICIAN ASSISTANT

## 2024-12-31 PROCEDURE — 1126F AMNT PAIN NOTED NONE PRSNT: CPT | Performed by: PHYSICIAN ASSISTANT

## 2024-12-31 PROCEDURE — 1160F RVW MEDS BY RX/DR IN RCRD: CPT | Performed by: PHYSICIAN ASSISTANT

## 2024-12-31 PROCEDURE — 3008F BODY MASS INDEX DOCD: CPT | Performed by: PHYSICIAN ASSISTANT

## 2024-12-31 PROCEDURE — 1159F MED LIST DOCD IN RCRD: CPT | Performed by: PHYSICIAN ASSISTANT

## 2024-12-31 ASSESSMENT — SLEEP AND FATIGUE QUESTIONNAIRES
SITING INACTIVE IN A PUBLIC PLACE LIKE A CLASS ROOM OR A MOVIE THEATER: WOULD NEVER DOZE
HOW LIKELY ARE YOU TO NOD OFF OR FALL ASLEEP WHEN YOU ARE A PASSENGER IN A CAR FOR AN HOUR WITHOUT A BREAK: WOULD NEVER DOZE
HOW LIKELY ARE YOU TO NOD OFF OR FALL ASLEEP WHILE WATCHING TV: SLIGHT CHANCE OF DOZING
HOW LIKELY ARE YOU TO NOD OFF OR FALL ASLEEP WHILE SITTING AND TALKING TO SOMEONE: WOULD NEVER DOZE
ESS-CHAD TOTAL SCORE: 2
HOW LIKELY ARE YOU TO NOD OFF OR FALL ASLEEP WHILE SITTING QUIETLY AFTER LUNCH WITHOUT ALCOHOL: WOULD NEVER DOZE
HOW LIKELY ARE YOU TO NOD OFF OR FALL ASLEEP IN A CAR, WHILE STOPPED FOR A FEW MINUTES IN TRAFFIC: WOULD NEVER DOZE
HOW LIKELY ARE YOU TO NOD OFF OR FALL ASLEEP WHILE SITTING AND READING: WOULD NEVER DOZE
HOW LIKELY ARE YOU TO NOD OFF OR FALL ASLEEP WHILE LYING DOWN TO REST IN THE AFTERNOON WHEN CIRCUMSTANCES PERMIT: SLIGHT CHANCE OF DOZING

## 2024-12-31 ASSESSMENT — PAIN SCALES - GENERAL: PAINLEVEL_OUTOF10: 0-NO PAIN

## 2024-12-31 NOTE — PATIENT INSTRUCTIONS
Great seeing you today,    It looks like your home sleep study shows your sleep apnea is controlled with the oral appliance device and avoiding sleeping on your back.    Herbert Pizarro PA-C    IMPORTANT PHONE NUMBERS     [id number] ([id number])  [employer] [facility]): [phone]  For clinical questions and refilling prescriptions: [phone]  Marta Rhodes (For Dulce Maria/Moira): P: [phone]

## 2024-12-31 NOTE — PROGRESS NOTES
"[facility]  Followup Visit Note    HISTORY OF PRESENT ILLNESS   Micky Koenig \"Nilton\" is a 64 y.o. male who presents to a [facility] for followup.       Current History    His recent home sleep study with his oral appliance device in and practicing positional therapy was consistent with either no sleep apnea or very mild sleep apnea.    His symptoms have improved.  He is not snoring with the oral appliance device according to his wife unless he is sick.  He is not often waking up at night anymore.  Although he denies significant daytime sleepiness he does feel less tired in the day with the oral appliance.    He states he will appliance device is comfortable.  To practice positional therapy he mostly just tries to stay off of his back and uses a pillow.    Weight is down from 220 to 214 today.    Sleep Scales:  ESS: 2     REVIEW OF SYSTEMS    All other systems negative      PHYSICAL EXAM     VITAL SIGNS: /72   Pulse 63   Ht 1.88 m (6' 2\")   Wt 97.1 kg (214 lb)   BMI 27.48 kg/m²      PREVIOUS WEIGHTS:  Wt Readings from Last 3 Encounters:   12/31/24 97.1 kg (214 lb)   10/08/24 101 kg (222 lb)   10/01/24 101 kg (222 lb)       Constitutional: Alert and oriented, cooperative, no obvious distress   HEENT: Non icteric or anemic, EOM WNL bilaterally   Neck: Supple, no JVD, no goiter, no adenopathy, no rigidity  Extremities: No clubbing, no LL edema   Neuromuscular: Cranial nerves grossly intact, no focal deficits     RESULTS/DATA     No results found for: \"IRON\", \"TRANSFERRIN\", \"IRONSAT\", \"TIBC\", \"FERRITIN\"      ASSESSMENT/PLAN     Mr. Koenig is a 64 y.o. male and returns in followup for the following issues:    OBSTRUCTIVE SLEEP APNEA  -Benefiting from OAT  -HSAT showed mild/no sleep apnea with device in and avoiding supine sleep as much as possible  -seeing dentist soon    BMI>25  -Body mass index is 27.48 kg/m².  Today  -weight is down in past " year, has goal to gradually lose more    Follow up as needed

## 2025-01-14 ENCOUNTER — SPECIALTY PHARMACY (OUTPATIENT)
Dept: PHARMACY | Facility: CLINIC | Age: 65
End: 2025-01-14

## 2025-01-15 PROCEDURE — RXMED WILLOW AMBULATORY MEDICATION CHARGE

## 2025-01-16 ENCOUNTER — PHARMACY VISIT (OUTPATIENT)
Dept: PHARMACY | Facility: CLINIC | Age: 65
End: 2025-01-16
Payer: MEDICARE

## 2025-01-28 PROCEDURE — RXMED WILLOW AMBULATORY MEDICATION CHARGE

## 2025-01-29 ENCOUNTER — PHARMACY VISIT (OUTPATIENT)
Dept: PHARMACY | Facility: CLINIC | Age: 65
End: 2025-01-29
Payer: MEDICARE

## 2025-01-31 ENCOUNTER — SPECIALTY PHARMACY (OUTPATIENT)
Dept: PHARMACY | Facility: CLINIC | Age: 65
End: 2025-01-31

## 2025-01-31 NOTE — PROGRESS NOTES
"[facility] Clinical Note    Micky Koenig \"Nilton\" is a 64 y.o. male, who is on the specialty pharmacy service for management of:  Pulmonary Fibrosis Core.    Micky Koenig \"Nilton\" is taking: Pirfenidone.    Medication Receipt Date: Delivered 1/30/25  Medication Start Date (planned or actual): Current to therapy    Micky was contacted on 1/31/2025 at 10:02 AM for a virtual pharmacy visit with encounter number [id number] from:   [facility]  [address] [place] [id number]  Dept: [phone]  Dept Fax: [phone]    Micky was offered a Telemedicine Video visit or Telephone visit.  Micky consented to a telephone visit, which was performed.    The most recent encounter visit with the referring prescriber Quinten Sams on 9/26/24 (Date) was reviewed.  Pharmacy will continue to collaborate in the care of this patient with the referring prescriber Quinten Sams.    General Assessment      Impression/Plan  IMPRESSION/PLAN:  Is patient high risk (potential patients:  pregnancy, geriatric, pediatric)?  geriatric  Is laboratory follow-up needed? Per MD  Is a clinical intervention needed? no  Next reassessment date? 3 months  Additional comments:     Refer to the encounter summary report for documentation details about patient counseling and education.      Medication Adherence    The importance of adherence was discussed with the patient and they were advised to take the medication as prescribed by their provider. Patient was encouraged to call their physician's office if they have a question regarding a missed dose.     QOL/Patient Satisfaction  Rate your quality of life on scale of 1-10: 10 - Completely satisfied  Rate your satisfaction with  Specialty Pharmacy on scale of 1-10: 10 - Completely satisfied      Patient was advised to contact the pharmacy if there are any changes to their medication list, including prescriptions, OTC medications, herbal " products, or supplements. Patient was advised of [facility]'s dispensing process, refill timeline, contact information ([phone]), and patient management follow up. Patient confirmed understanding of education conducted during assessment. All patient questions and concerns were addressed to the best of my ability. Patient was encouraged to contact the specialty pharmacy with any questions or concerns.    Confirmed follow-up outreaches are properly scheduled and reviewed goals of therapy with the patient.        Oswaldo Anaya, PharmD

## 2025-02-03 DIAGNOSIS — E78.5 DYSLIPIDEMIA: ICD-10-CM

## 2025-02-03 RX ORDER — ATORVASTATIN CALCIUM 20 MG/1
20 TABLET, FILM COATED ORAL DAILY
Qty: 90 TABLET | Refills: 0 | Status: SHIPPED | OUTPATIENT
Start: 2025-02-03 | End: 2025-02-04 | Stop reason: SDUPTHER

## 2025-02-04 DIAGNOSIS — E78.5 DYSLIPIDEMIA: ICD-10-CM

## 2025-02-04 PROCEDURE — RXMED WILLOW AMBULATORY MEDICATION CHARGE

## 2025-02-04 RX ORDER — ATORVASTATIN CALCIUM 20 MG/1
20 TABLET, FILM COATED ORAL DAILY
Qty: 90 TABLET | Refills: 0 | Status: SHIPPED | OUTPATIENT
Start: 2025-02-04 | End: 2026-02-04

## 2025-02-06 ENCOUNTER — PHARMACY VISIT (OUTPATIENT)
Dept: PHARMACY | Facility: CLINIC | Age: 65
End: 2025-02-06
Payer: MEDICARE

## 2025-02-21 ENCOUNTER — SPECIALTY PHARMACY (OUTPATIENT)
Dept: PHARMACY | Facility: CLINIC | Age: 65
End: 2025-02-21

## 2025-02-21 PROCEDURE — RXMED WILLOW AMBULATORY MEDICATION CHARGE

## 2025-02-24 ENCOUNTER — PHARMACY VISIT (OUTPATIENT)
Dept: PHARMACY | Facility: CLINIC | Age: 65
End: 2025-02-24
Payer: MEDICARE

## 2025-03-17 ENCOUNTER — SPECIALTY PHARMACY (OUTPATIENT)
Dept: PHARMACY | Facility: CLINIC | Age: 65
End: 2025-03-17

## 2025-03-17 PROCEDURE — RXMED WILLOW AMBULATORY MEDICATION CHARGE

## 2025-03-21 ENCOUNTER — PHARMACY VISIT (OUTPATIENT)
Dept: PHARMACY | Facility: CLINIC | Age: 65
End: 2025-03-21
Payer: MEDICARE

## 2025-03-21 PROCEDURE — RXMED WILLOW AMBULATORY MEDICATION CHARGE

## 2025-03-24 ENCOUNTER — PHARMACY VISIT (OUTPATIENT)
Dept: PHARMACY | Facility: CLINIC | Age: 65
End: 2025-03-24
Payer: MEDICARE

## 2025-03-29 LAB
ANION GAP SERPL CALCULATED.4IONS-SCNC: 7 MMOL/L (CALC) (ref 7–17)
BUN SERPL-MCNC: 21 MG/DL (ref 7–25)
BUN/CREAT SERPL: NORMAL (CALC) (ref 6–22)
CALCIUM SERPL-MCNC: 9.5 MG/DL (ref 8.6–10.3)
CHLORIDE SERPL-SCNC: 101 MMOL/L (ref 98–110)
CHOLEST SERPL-MCNC: 244 MG/DL
CHOLEST/HDLC SERPL: 3.4 (CALC)
CO2 SERPL-SCNC: 29 MMOL/L (ref 20–32)
CREAT SERPL-MCNC: 0.7 MG/DL (ref 0.7–1.35)
EGFRCR SERPLBLD CKD-EPI 2021: 102 ML/MIN/1.73M2
EST. AVERAGE GLUCOSE BLD GHB EST-MCNC: 105 MG/DL
EST. AVERAGE GLUCOSE BLD GHB EST-SCNC: 5.8 MMOL/L
GLUCOSE SERPL-MCNC: 92 MG/DL (ref 65–99)
HBA1C MFR BLD: 5.3 % OF TOTAL HGB
HDLC SERPL-MCNC: 71 MG/DL
LDLC SERPL CALC-MCNC: 152 MG/DL (CALC)
NONHDLC SERPL-MCNC: 173 MG/DL (CALC)
POTASSIUM SERPL-SCNC: 4.4 MMOL/L (ref 3.5–5.3)
SODIUM SERPL-SCNC: 137 MMOL/L (ref 135–146)
TRIGL SERPL-MCNC: 97 MG/DL

## 2025-04-01 ENCOUNTER — APPOINTMENT (OUTPATIENT)
Dept: PRIMARY CARE | Facility: CLINIC | Age: 65
End: 2025-04-01
Payer: MEDICARE

## 2025-04-01 VITALS
DIASTOLIC BLOOD PRESSURE: 70 MMHG | OXYGEN SATURATION: 96 % | WEIGHT: 227 LBS | HEART RATE: 58 BPM | SYSTOLIC BLOOD PRESSURE: 106 MMHG | BODY MASS INDEX: 29.13 KG/M2 | HEIGHT: 74 IN

## 2025-04-01 DIAGNOSIS — I34.0 NONRHEUMATIC MITRAL VALVE REGURGITATION: ICD-10-CM

## 2025-04-01 DIAGNOSIS — E78.5 DYSLIPIDEMIA: ICD-10-CM

## 2025-04-01 DIAGNOSIS — J84.112 IPF (IDIOPATHIC PULMONARY FIBROSIS) (MULTI): Primary | ICD-10-CM

## 2025-04-01 DIAGNOSIS — I27.20 PULMONARY HYPERTENSION (MULTI): ICD-10-CM

## 2025-04-01 DIAGNOSIS — I36.1 NONRHEUMATIC TRICUSPID VALVE REGURGITATION: ICD-10-CM

## 2025-04-01 DIAGNOSIS — E78.00 PURE HYPERCHOLESTEROLEMIA: ICD-10-CM

## 2025-04-01 DIAGNOSIS — M16.12 OSTEOARTHRITIS OF LEFT HIP, UNSPECIFIED OSTEOARTHRITIS TYPE: ICD-10-CM

## 2025-04-01 DIAGNOSIS — I25.10 ATHEROSCLEROSIS OF NATIVE CORONARY ARTERY OF NATIVE HEART WITHOUT ANGINA PECTORIS: ICD-10-CM

## 2025-04-01 PROCEDURE — RXMED WILLOW AMBULATORY MEDICATION CHARGE

## 2025-04-01 RX ORDER — ATORVASTATIN CALCIUM 40 MG/1
40 TABLET, FILM COATED ORAL DAILY
Qty: 90 TABLET | Refills: 1 | Status: SHIPPED | OUTPATIENT
Start: 2025-04-01 | End: 2025-04-01 | Stop reason: SDUPTHER

## 2025-04-01 RX ORDER — TREPROSTINIL 64 UG/1
64 INHALANT ORAL 4 TIMES DAILY
COMMUNITY
Start: 2025-03-24

## 2025-04-01 RX ORDER — ATORVASTATIN CALCIUM 40 MG/1
40 TABLET, FILM COATED ORAL DAILY
Qty: 90 TABLET | Refills: 1 | Status: SHIPPED | OUTPATIENT
Start: 2025-04-01 | End: 2026-04-01

## 2025-04-01 ASSESSMENT — ENCOUNTER SYMPTOMS
LOSS OF SENSATION IN FEET: 0
OCCASIONAL FEELINGS OF UNSTEADINESS: 0
DEPRESSION: 0

## 2025-04-01 ASSESSMENT — LIFESTYLE VARIABLES
HOW MANY STANDARD DRINKS CONTAINING ALCOHOL DO YOU HAVE ON A TYPICAL DAY: 1 OR 2
HOW OFTEN DO YOU HAVE SIX OR MORE DRINKS ON ONE OCCASION: NEVER
AUDIT-C TOTAL SCORE: 2
SKIP TO QUESTIONS 9-10: 1
HOW OFTEN DO YOU HAVE A DRINK CONTAINING ALCOHOL: 2-4 TIMES A MONTH

## 2025-04-01 ASSESSMENT — PATIENT HEALTH QUESTIONNAIRE - PHQ9
1. LITTLE INTEREST OR PLEASURE IN DOING THINGS: NOT AT ALL
2. FEELING DOWN, DEPRESSED OR HOPELESS: NOT AT ALL
SUM OF ALL RESPONSES TO PHQ9 QUESTIONS 1 AND 2: 0

## 2025-04-01 NOTE — PROGRESS NOTES
"Current thank you Subjective   Patient ID: Nilton Koenig is a 64 y.o. male who presents for Follow-up.    HPI patient presents to  clinic for follow-up visit on history of severe DJD of hips, coronary atherosclerosis,allergic rhinitis, IPF, KIM,tricuspid and mitral regurgitation, normal colonoscopy on 12/30/23,mild obesity ,s/p right heart heart cath on 1/14/2025 at [facility] showed precapillary pulmonary hypertension with reduced cardiac index,status post left direct anterior total hip arthroplasty on 5/11/2023 and right direct total hip arthroplasty on 8/7/2023 secondary to severe DJD of hips and dyslipidemia.     Review of Systems   Constitutional: Negative.    HENT: Negative.     Eyes: Negative.    Respiratory: Negative.     Cardiovascular: Negative.    Gastrointestinal: Negative.    Endocrine: Negative.    Genitourinary: Negative.    Musculoskeletal: Negative.    Skin: Negative.    Allergic/Immunologic: Negative.    Neurological: Negative.    Hematological: Negative.    Psychiatric/Behavioral: Negative.         Objective   /70 (BP Location: Right arm, Patient Position: Sitting)   Pulse 58   Ht 1.88 m (6' 2\")   Wt 103 kg (227 lb)   SpO2 96%   BMI 29.15 kg/m²     Physical Exam  Constitutional:       Appearance: Normal appearance. He is normal weight.   HENT:      Right Ear: Tympanic membrane normal.      Left Ear: Tympanic membrane and ear canal normal.      Nose: Nose normal.   Neck:      Vascular: No carotid bruit.   Cardiovascular:      Rate and Rhythm: Normal rate.   Pulmonary:      Effort: No respiratory distress.      Breath sounds: No stridor. No wheezing.   Abdominal:      Palpations: Abdomen is soft.      Tenderness: There is no abdominal tenderness. There is no guarding or rebound.   Skin:     Coloration: Skin is not jaundiced.      Findings: No bruising.   Neurological:      General: No focal deficit present.      Mental Status: He is alert and oriented to person, place, and time. "   Psychiatric:         Mood and Affect: Mood normal.         Assessment/Plan   Assessment & Plan  IPF (idiopathic pulmonary fibrosis) (Multi)  He will continue pirfenidone regarding history of idiopathic pulmonary fibrosis.  He will also continue to follow-up with lung transplant team at [facility].  And Tyvaso  Orders:    Comprehensive metabolic panel; Future    Urinalysis with Reflex Microscopic; Future    Osteoarthritis of left hip, unspecified osteoarthritis type  He will continue Tylenol arthritis regarding arthritis of the hip joint.  Orders:    Urinalysis with Reflex Microscopic; Future    Pure hypercholesterolemia  To continue atorvastatin       Atherosclerosis of native coronary artery of native heart without angina pectoris  He will be referred to cardiology and will continue aspirin and atorvastatin.  Orders:    Referral to Cardiology; Future    Comprehensive metabolic panel; Future    Dyslipidemia  He will continue atorvastatin regarding dyslipidemia.  Orders:    atorvastatin (Lipitor) 40 mg tablet; Take 1 tablet (40 mg) by mouth once daily.    Lipid Panel; Future    CBC and Auto Differential; Future    Comprehensive metabolic panel; Future    Nonrheumatic tricuspid valve regurgitation  He will be referred to cardiology clinic regarding valvular heart disease.  Orders:    Referral to Cardiology; Future    CBC and Auto Differential; Future    Nonrheumatic mitral valve regurgitation  He will be scheduled for routine blood work.  Orders:    Referral to Cardiology; Future    Pulmonary hypertension (Multi)  He will continue Tyvaso regarding history of pulmonary hypertension.

## 2025-04-01 NOTE — ASSESSMENT & PLAN NOTE
He will continue pirfenidone regarding history of idiopathic pulmonary fibrosis.  He will also continue to follow-up with lung transplant team at [facility].  And Tyvaso  Orders:    Comprehensive metabolic panel; Future    Urinalysis with Reflex Microscopic; Future

## 2025-04-01 NOTE — ASSESSMENT & PLAN NOTE
He will continue Tylenol arthritis regarding arthritis of the hip joint.  Orders:    Urinalysis with Reflex Microscopic; Future

## 2025-04-03 ENCOUNTER — PHARMACY VISIT (OUTPATIENT)
Dept: PHARMACY | Facility: CLINIC | Age: 65
End: 2025-04-03
Payer: MEDICARE

## 2025-04-05 ASSESSMENT — ENCOUNTER SYMPTOMS
CONSTITUTIONAL NEGATIVE: 1
EYES NEGATIVE: 1
ENDOCRINE NEGATIVE: 1
GASTROINTESTINAL NEGATIVE: 1
MUSCULOSKELETAL NEGATIVE: 1
ALLERGIC/IMMUNOLOGIC NEGATIVE: 1
HEMATOLOGIC/LYMPHATIC NEGATIVE: 1
NEUROLOGICAL NEGATIVE: 1
PSYCHIATRIC NEGATIVE: 1
CARDIOVASCULAR NEGATIVE: 1
RESPIRATORY NEGATIVE: 1

## 2025-04-14 ENCOUNTER — SPECIALTY PHARMACY (OUTPATIENT)
Dept: PHARMACY | Facility: CLINIC | Age: 65
End: 2025-04-14

## 2025-04-14 PROCEDURE — RXMED WILLOW AMBULATORY MEDICATION CHARGE

## 2025-04-18 ENCOUNTER — PHARMACY VISIT (OUTPATIENT)
Dept: PHARMACY | Facility: CLINIC | Age: 65
End: 2025-04-18
Payer: MEDICARE

## 2025-04-30 ENCOUNTER — SPECIALTY PHARMACY (OUTPATIENT)
Dept: PHARMACY | Facility: CLINIC | Age: 65
End: 2025-04-30

## 2025-04-30 NOTE — PROGRESS NOTES
"[facility] Clinical Note  Patient Reassessment     Introduction  Micky Koenig \"Nilton\" is a 64 y.o. male who is on the specialty pharmacy service for management of: Pulmonology Core.      [facility] Esbriet - Take 1 tablet (801mg) by mouth three times daily with meals.    Duration of therapy: Maintenance    The most recent encounter visit with the referring prescriber Quinten Sams MD on 2025 was reviewed. Pharmacy will continue to collaborate in the care of this patient with the referring prescriber.    Discussion  Micky was contacted on 2025 at 3:15 PM for a pharmacy visit with encounter number [id number] from:   [facility]  [address] [place] [id number]  Dept: [phone]  Dept Fax: [phone]  Micky consented to a Telephone visit, which was performed.    Efficacy  Patient has developed new symptoms of condition: No  Patient/caregiver feels medication is affecting the disease state: Reports doing well, feels breathing is okay. Stated good QOL.    Per provider note, FEV1 Trackin23 - 83%  3/7/24 - 81%  24 - 81%  24 - 84%, 13% improvement in FEV1 post-BD   10/16/24 - 82%  24 - 85%    Goals  Provided education on goals and possible outcomes of therapy:  Adherence with therapy  Timely completion of appropriate labs  Timely and appropriate follow up with provider  Identify and address medication interactions with presciption medications, OTC medications and supplements  Optimize or maintain quality of life  Asthma/Immunology: Reduce rate of respiratory decline  Prolong life  Patient has documented target(s) for goals of therapy: Yes  Patient status for goal(s): On track    Tolerance  Patient has experienced side effects from this medication: No  Changes to current therapy regimen: No    The follow-up timeline was discussed. Every person responds to and reacts to therapy differently. Patient should " be assessed for efficacy and tolerability in approximately: 6 months    Adherence  Patient Information  Informant: Self (Patient)  Demonstrates Understanding of Importance of Adherence: Yes  Does the patient have any barriers to self-administration (including physical and mental?): No  Support Network for Adherence: Healthcare Provider  Adherence Tools Used:  (Mental note of time of day)  Medication Information  Medication: pirfenidone (Esbriet)  Patient Reported Missed Doses in the Last 4 Weeks:  (Stated 1-2 missed doses here or there per patient but he could not think of any recently)  Estimated Medication Adherence Level: %  Adherence Estimation Source:  (Patient)  Barriers to Adherence: No Problems identified     The importance of adherence was discussed and patient/caregiver was advised to take the medication as prescribed by their provider. Encouraged patient/caregiver to call physician's office or specialty pharmacy if they have a question regarding a missed dose.    General Assessment  Changes to home medications, OTCs or supplements: Yes - Tyvaso inhaler  Current Medications[1]  Reported new allergies: No  Reported new medical conditions: Pulmonary Hypertension  Additional monitoring reviewed: Reported he goes to get updated PFTs soon  Is laboratory follow up needed? Per MD - AST 29 and ALT 10 as of 2/7/2025    Advised to contact the pharmacy if there are any changes to the patient's medication list, including prescriptions, OTC medications, herbal products, or supplements.    Impression/Plan  This patient has been identified as high risk due to Geriatric (over 65 years of age).  The following action was taken: Patient/caregiver encouraged to participate in patient management program.    QOL/Patient Satisfaction  Rate your quality of life on scale of 1-10: 10 - Completely satisfied  Rate your satisfaction with  Specialty Pharmacy on scale of 1-10: 10 - Completely satisfied (Some delays at start of  year due to insurance noted by patient)    Provided contact information ([phone]) for [facility] and reviewed dispensing process, refill timeline and patient management follow up. Confirmed understanding of education conducted during assessment. All questions and concerns were addressed and patient/caregiver was encouraged to reach out for additional questions or concerns.    Based on the patient's diagnosis, medication list, progress towards goals, adherence, tolerance, and medication list, medication remains appropriate: Therapy remains appropriate (I attest)    Aparna Person, PharmD          [1]   Current Outpatient Medications   Medication Sig Dispense Refill    acetaminophen (Tylenol) 500 mg tablet Take 1 tablet (500 mg) by mouth every 6 hours if needed for mild pain (1 - 3).      albuterol 90 mcg/actuation inhaler Inhale 2 puffs as instructed every 4 hours as needed for wheezing/shortness of breath. 6.7 g 2    albuterol 90 mcg/actuation inhaler Inhale 2 Puffs as instructed every 4 hours as needed for wheezing/shortness of breath. 6.7 g 2    aspirin 81 mg EC tablet Take 1 tablet (81 mg) by mouth once daily.      atorvastatin (Lipitor) 40 mg tablet Take 1 tablet (40 mg) by mouth once daily. 90 tablet 1    cetirizine (ZyrTEC) 10 mg tablet Take 1 tablet (10 mg) by mouth once daily.      ipratropium (Atrovent) 21 mcg (0.03 %) nasal spray Administer 1 spray into each nostril 3 times a day. 30 mL 1    pirfenidone (Esbriet) 801 mg tablet TAKE ONE (1) TABLET BY MOUTH THREE TIMES DAILY WITH MEALS 270 tablet 11    treprostiniL (Tyvaso DPI) 64 mcg cartridge with inhaler Inhale 1 puff 4 times a day.       No current facility-administered medications for this visit.

## 2025-05-13 ENCOUNTER — SPECIALTY PHARMACY (OUTPATIENT)
Dept: PHARMACY | Facility: CLINIC | Age: 65
End: 2025-05-13

## 2025-05-13 PROCEDURE — RXMED WILLOW AMBULATORY MEDICATION CHARGE

## 2025-05-15 ENCOUNTER — PHARMACY VISIT (OUTPATIENT)
Dept: PHARMACY | Facility: CLINIC | Age: 65
End: 2025-05-15
Payer: MEDICARE

## 2025-06-06 ENCOUNTER — SPECIALTY PHARMACY (OUTPATIENT)
Dept: PHARMACY | Facility: CLINIC | Age: 65
End: 2025-06-06

## 2025-06-06 PROCEDURE — RXMED WILLOW AMBULATORY MEDICATION CHARGE

## 2025-06-13 ENCOUNTER — PHARMACY VISIT (OUTPATIENT)
Dept: PHARMACY | Facility: CLINIC | Age: 65
End: 2025-06-13
Payer: MEDICARE

## 2025-06-25 PROCEDURE — RXMED WILLOW AMBULATORY MEDICATION CHARGE

## 2025-06-27 ENCOUNTER — PHARMACY VISIT (OUTPATIENT)
Dept: PHARMACY | Facility: CLINIC | Age: 65
End: 2025-06-27
Payer: MEDICARE

## 2025-07-09 ENCOUNTER — SPECIALTY PHARMACY (OUTPATIENT)
Dept: PHARMACY | Facility: CLINIC | Age: 65
End: 2025-07-09

## 2025-07-09 PROCEDURE — RXMED WILLOW AMBULATORY MEDICATION CHARGE

## 2025-07-11 ENCOUNTER — PHARMACY VISIT (OUTPATIENT)
Dept: PHARMACY | Facility: CLINIC | Age: 65
End: 2025-07-11
Payer: MEDICARE

## 2025-07-31 DIAGNOSIS — J30.9 ALLERGIC RHINITIS, UNSPECIFIED SEASONALITY, UNSPECIFIED TRIGGER: ICD-10-CM

## 2025-08-01 ENCOUNTER — SPECIALTY PHARMACY (OUTPATIENT)
Dept: PHARMACY | Facility: CLINIC | Age: 65
End: 2025-08-01

## 2025-08-01 PROCEDURE — RXMED WILLOW AMBULATORY MEDICATION CHARGE

## 2025-08-01 RX ORDER — IPRATROPIUM BROMIDE 21 UG/1
1 SPRAY, METERED NASAL 3 TIMES DAILY
Qty: 30 ML | Refills: 3 | Status: SHIPPED | OUTPATIENT
Start: 2025-08-01

## 2025-08-04 ENCOUNTER — PHARMACY VISIT (OUTPATIENT)
Dept: PHARMACY | Facility: CLINIC | Age: 65
End: 2025-08-04
Payer: MEDICARE

## 2025-08-25 ENCOUNTER — NURSE TRIAGE (OUTPATIENT)
Dept: AUDIOLOGY | Facility: CLINIC | Age: 65
End: 2025-08-25
Payer: MEDICARE

## 2025-08-26 ENCOUNTER — SPECIALTY PHARMACY (OUTPATIENT)
Dept: PHARMACY | Facility: CLINIC | Age: 65
End: 2025-08-26

## 2025-08-26 PROCEDURE — RXMED WILLOW AMBULATORY MEDICATION CHARGE

## 2025-08-28 ENCOUNTER — PHARMACY VISIT (OUTPATIENT)
Dept: PHARMACY | Facility: CLINIC | Age: 65
End: 2025-08-28
Payer: MEDICARE

## 2025-09-22 ENCOUNTER — APPOINTMENT (OUTPATIENT)
Dept: AUDIOLOGY | Facility: CLINIC | Age: 65
End: 2025-09-22
Payer: MEDICARE

## 2025-09-22 ENCOUNTER — APPOINTMENT (OUTPATIENT)
Dept: AUDIOLOGY | Facility: CLINIC | Age: 65
End: 2025-09-22

## 2025-10-01 ENCOUNTER — APPOINTMENT (OUTPATIENT)
Dept: PRIMARY CARE | Facility: CLINIC | Age: 65
End: 2025-10-01
Payer: MEDICARE